# Patient Record
Sex: MALE | Race: OTHER | HISPANIC OR LATINO | Employment: PART TIME | URBAN - METROPOLITAN AREA
[De-identification: names, ages, dates, MRNs, and addresses within clinical notes are randomized per-mention and may not be internally consistent; named-entity substitution may affect disease eponyms.]

---
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
## 2018-01-29 ENCOUNTER — TRANSCRIBE ORDERS (OUTPATIENT)
Dept: LAB | Facility: HOSPITAL | Age: 18
End: 2018-01-29

## 2018-01-29 ENCOUNTER — APPOINTMENT (OUTPATIENT)
Dept: LAB | Facility: HOSPITAL | Age: 18
End: 2018-01-29
Attending: PODIATRIST
Payer: COMMERCIAL

## 2018-01-29 DIAGNOSIS — B35.1 DERMATOPHYTOSIS OF NAIL: Primary | ICD-10-CM

## 2018-01-29 DIAGNOSIS — B35.1 DERMATOPHYTOSIS OF NAIL: ICD-10-CM

## 2018-01-29 LAB
ALBUMIN SERPL BCP-MCNC: 4 G/DL (ref 3.5–5)
ALP SERPL-CCNC: 77 U/L (ref 46–484)
ALT SERPL W P-5'-P-CCNC: 21 U/L (ref 12–78)
AST SERPL W P-5'-P-CCNC: 23 U/L (ref 5–45)
BILIRUB DIRECT SERPL-MCNC: 0.18 MG/DL (ref 0–0.2)
BILIRUB SERPL-MCNC: 0.48 MG/DL (ref 0.2–1)
PROT SERPL-MCNC: 7.1 G/DL (ref 6.4–8.2)

## 2018-01-29 PROCEDURE — 36415 COLL VENOUS BLD VENIPUNCTURE: CPT

## 2018-01-29 PROCEDURE — 80076 HEPATIC FUNCTION PANEL: CPT

## 2018-02-19 ENCOUNTER — APPOINTMENT (OUTPATIENT)
Dept: LAB | Facility: HOSPITAL | Age: 18
End: 2018-02-19
Attending: PODIATRIST
Payer: COMMERCIAL

## 2018-02-19 ENCOUNTER — TRANSCRIBE ORDERS (OUTPATIENT)
Dept: LAB | Facility: HOSPITAL | Age: 18
End: 2018-02-19

## 2018-02-19 DIAGNOSIS — B35.1 DERMATOPHYTOSIS OF NAIL: ICD-10-CM

## 2018-02-19 DIAGNOSIS — B35.1 DERMATOPHYTOSIS OF NAIL: Primary | ICD-10-CM

## 2018-02-19 LAB
ALBUMIN SERPL BCP-MCNC: 3.9 G/DL (ref 3.5–5)
ALP SERPL-CCNC: 87 U/L (ref 46–484)
ALT SERPL W P-5'-P-CCNC: 21 U/L (ref 12–78)
AST SERPL W P-5'-P-CCNC: 17 U/L (ref 5–45)
BILIRUB DIRECT SERPL-MCNC: 0.16 MG/DL (ref 0–0.2)
BILIRUB SERPL-MCNC: 0.51 MG/DL (ref 0.2–1)
PROT SERPL-MCNC: 7.3 G/DL (ref 6.4–8.2)

## 2018-02-19 PROCEDURE — 36415 COLL VENOUS BLD VENIPUNCTURE: CPT

## 2018-02-19 PROCEDURE — 80076 HEPATIC FUNCTION PANEL: CPT

## 2018-11-03 ENCOUNTER — HOSPITAL ENCOUNTER (EMERGENCY)
Facility: HOSPITAL | Age: 18
Discharge: HOME | End: 2018-11-04
Attending: EMERGENCY MEDICINE | Admitting: EMERGENCY MEDICINE
Payer: COMMERCIAL

## 2018-11-03 DIAGNOSIS — A08.4 VIRAL GASTROENTERITIS: Primary | ICD-10-CM

## 2018-11-03 LAB
BASOPHILS # BLD AUTO: 0.03 THOUSANDS/ΜL (ref 0–0.1)
BASOPHILS NFR BLD AUTO: 0 % (ref 0–1)
EOSINOPHIL # BLD AUTO: 0.04 THOUSAND/ΜL (ref 0–0.61)
EOSINOPHIL NFR BLD AUTO: 1 % (ref 0–6)
ERYTHROCYTE [DISTWIDTH] IN BLOOD BY AUTOMATED COUNT: 11.4 % (ref 11.6–15.1)
HCT VFR BLD AUTO: 40.7 % (ref 36.5–49.3)
HGB BLD-MCNC: 13.7 G/DL (ref 12–17)
IMM GRANULOCYTES # BLD AUTO: 0.02 THOUSAND/UL (ref 0–0.2)
IMM GRANULOCYTES NFR BLD AUTO: 0 % (ref 0–2)
LYMPHOCYTES # BLD AUTO: 2.84 THOUSANDS/ΜL (ref 0.6–4.47)
LYMPHOCYTES NFR BLD AUTO: 37 % (ref 14–44)
MCH RBC QN AUTO: 30.3 PG (ref 26.8–34.3)
MCHC RBC AUTO-ENTMCNC: 33.7 G/DL (ref 31.4–37.4)
MCV RBC AUTO: 90 FL (ref 82–98)
MONOCYTES # BLD AUTO: 0.6 THOUSAND/ΜL (ref 0.17–1.22)
MONOCYTES NFR BLD AUTO: 8 % (ref 4–12)
NEUTROPHILS # BLD AUTO: 4.14 THOUSANDS/ΜL (ref 1.85–7.62)
NEUTS SEG NFR BLD AUTO: 54 % (ref 43–75)
NRBC BLD AUTO-RTO: 0 /100 WBCS
PLATELET # BLD AUTO: 260 THOUSANDS/UL (ref 149–390)
PMV BLD AUTO: 10.1 FL (ref 8.9–12.7)
RBC # BLD AUTO: 4.52 MILLION/UL (ref 3.88–5.62)
WBC # BLD AUTO: 7.67 THOUSAND/UL (ref 4.31–10.16)

## 2018-11-03 PROCEDURE — 83690 ASSAY OF LIPASE: CPT | Performed by: EMERGENCY MEDICINE

## 2018-11-03 PROCEDURE — 99284 EMERGENCY DEPT VISIT MOD MDM: CPT

## 2018-11-03 PROCEDURE — 96361 HYDRATE IV INFUSION ADD-ON: CPT

## 2018-11-03 PROCEDURE — 80053 COMPREHEN METABOLIC PANEL: CPT | Performed by: EMERGENCY MEDICINE

## 2018-11-03 PROCEDURE — 96374 THER/PROPH/DIAG INJ IV PUSH: CPT

## 2018-11-03 PROCEDURE — 36415 COLL VENOUS BLD VENIPUNCTURE: CPT

## 2018-11-03 PROCEDURE — 85025 COMPLETE CBC W/AUTO DIFF WBC: CPT | Performed by: EMERGENCY MEDICINE

## 2018-11-03 RX ORDER — DICYCLOMINE HCL 20 MG
20 TABLET ORAL ONCE
Status: COMPLETED | OUTPATIENT
Start: 2018-11-03 | End: 2018-11-03

## 2018-11-03 RX ORDER — ONDANSETRON 2 MG/ML
4 INJECTION INTRAMUSCULAR; INTRAVENOUS ONCE
Status: COMPLETED | OUTPATIENT
Start: 2018-11-03 | End: 2018-11-03

## 2018-11-03 RX ADMIN — SODIUM CHLORIDE 1000 ML: 0.9 INJECTION, SOLUTION INTRAVENOUS at 23:30

## 2018-11-03 RX ADMIN — ONDANSETRON 4 MG: 2 INJECTION INTRAMUSCULAR; INTRAVENOUS at 23:30

## 2018-11-03 RX ADMIN — DICYCLOMINE HYDROCHLORIDE 20 MG: 20 TABLET ORAL at 23:30

## 2018-11-04 VITALS
TEMPERATURE: 98 F | RESPIRATION RATE: 16 BRPM | BODY MASS INDEX: 25.1 KG/M2 | OXYGEN SATURATION: 99 % | WEIGHT: 180 LBS | HEART RATE: 66 BPM | DIASTOLIC BLOOD PRESSURE: 72 MMHG | SYSTOLIC BLOOD PRESSURE: 118 MMHG

## 2018-11-04 LAB
ALBUMIN SERPL BCP-MCNC: 4.6 G/DL (ref 3.5–5)
ALP SERPL-CCNC: 95 U/L (ref 46–116)
ALT SERPL W P-5'-P-CCNC: 20 U/L (ref 12–78)
ANION GAP SERPL CALCULATED.3IONS-SCNC: 4 MMOL/L (ref 4–13)
AST SERPL W P-5'-P-CCNC: 14 U/L (ref 5–45)
BILIRUB SERPL-MCNC: 0.42 MG/DL (ref 0.2–1)
BUN SERPL-MCNC: 21 MG/DL (ref 5–25)
CALCIUM SERPL-MCNC: 9.3 MG/DL (ref 8.3–10.1)
CHLORIDE SERPL-SCNC: 105 MMOL/L (ref 100–108)
CO2 SERPL-SCNC: 30 MMOL/L (ref 21–32)
CREAT SERPL-MCNC: 0.77 MG/DL (ref 0.6–1.3)
GFR SERPL CREATININE-BSD FRML MDRD: 131 ML/MIN/1.73SQ M
GLUCOSE SERPL-MCNC: 83 MG/DL (ref 65–140)
LIPASE SERPL-CCNC: 75 U/L (ref 73–393)
POTASSIUM SERPL-SCNC: 3.5 MMOL/L (ref 3.5–5.3)
PROT SERPL-MCNC: 7.8 G/DL (ref 6.4–8.2)
SODIUM SERPL-SCNC: 139 MMOL/L (ref 136–145)

## 2018-11-04 PROCEDURE — 96361 HYDRATE IV INFUSION ADD-ON: CPT

## 2018-11-04 RX ORDER — ONDANSETRON 4 MG/1
4 TABLET, FILM COATED ORAL EVERY 6 HOURS
Qty: 12 TABLET | Refills: 0 | Status: SHIPPED | OUTPATIENT
Start: 2018-11-04 | End: 2020-05-27

## 2018-11-04 RX ORDER — FAMOTIDINE 20 MG/1
20 TABLET, FILM COATED ORAL DAILY
Qty: 30 TABLET | Refills: 0 | Status: SHIPPED | OUTPATIENT
Start: 2018-11-04 | End: 2020-05-27

## 2018-11-04 RX ORDER — MAGNESIUM HYDROXIDE/ALUMINUM HYDROXICE/SIMETHICONE 120; 1200; 1200 MG/30ML; MG/30ML; MG/30ML
30 SUSPENSION ORAL ONCE
Status: COMPLETED | OUTPATIENT
Start: 2018-11-04 | End: 2018-11-04

## 2018-11-04 RX ADMIN — ALUMINUM HYDROXIDE, MAGNESIUM HYDROXIDE, AND SIMETHICONE 30 ML: 200; 200; 20 SUSPENSION ORAL at 00:27

## 2018-11-04 RX ADMIN — LIDOCAINE HYDROCHLORIDE 15 ML: 20 SOLUTION ORAL; TOPICAL at 00:27

## 2018-11-04 NOTE — ED ATTENDING ATTESTATION
I, [address], DO, saw and evaluated the patient  I have discussed the patient with the resident/non-physician practitioner and agree with the resident's/non-physician practitioner's findings, Plan of Care, and MDM as documented in the resident's/non-physician practitioner's note, except where noted  All available labs and Radiology studies were reviewed  At this point I agree with the current assessment done in the Emergency Department  I have conducted an independent evaluation of this patient a history and physical is as follows:    78-year-old male presents with abdominal pain, nausea, vomiting, diarrhea  Symptoms started about 2 days ago  No fevers or chills  Denies medical problems  Abdominal pain is more cramping  On exam-no acute distress, heart regular, lungs clear, abdomen soft nontender    Plan-IV fluids, Zofran, Bentyl, check labs and reassess    Critical Care Time  CritCare Time    Procedures

## 2018-11-04 NOTE — DISCHARGE INSTRUCTIONS
Enteritis   WHAT YOU NEED TO KNOW:   Enteritis is inflammation of the small intestine  It may be caused by eating foods or drinking liquids contaminated with a virus, bacteria, or parasites  It may also be caused by certain medicines, damage from radiation, and medical conditions such as Crohn disease  DISCHARGE INSTRUCTIONS:   Return to the emergency department if:   · You cannot stop vomiting  · You have not urinated for 12 hours  Contact your healthcare provider if:   · You have a fever over 101 5  · You have blood or mucus in your bowel movements  · You continue to vomit or have diarrhea for more than 3 days, even after treatment  · You have a dry mouth and eyes, you are urinating less than usual, and you feel dizzy when you stand up  · Your mouth or eyes are dry  You are not urinating as much or as often  · You are losing weight without trying  · You have questions or concerns about your condition or care  Medicines:   · Medicines  may be given to fight an infection caused by bacteria or a parasite  You may also need medicines to slow or stop your diarrhea or vomiting  Do not take these medicines unless your healthcare provider say it is okay  Other medicines may be needed to treat medical conditions that are causing enteritis  · Take your medicine as directed  Contact your healthcare provider if you think your medicine is not helping or if you have side effects  Tell him of her if you are allergic to any medicine  Keep a list of the medicines, vitamins, and herbs you take  Include the amounts, and when and why you take them  Bring the list or the pill bottles to follow-up visits  Carry your medicine list with you in case of an emergency  Manage enteritis:   · Eat foods that help to decrease symptoms  Limit or avoid foods and liquids that are high in sugar, fat, and fiber to help relieve diarrhea  It may be helpful to avoid lactose   Lactose is a type of sugar that is found in milk products  You may be able to tolerate soups, broths, well-cooked vegetables, canned fruit, and baked or broiled meats  Ask your dietitian or healthcare provider if you should follow a special diet  You may need to avoid other foods if you have certain medical conditions such as celiac disease  · Drink liquids as directed  Ask how much liquid to drink each day and which liquids are best for you  It is important to prevent or treat dehydration  Even if you have been vomiting, suck on ice chips or take small sips of clear liquids often  Slowly increase the amount of clear liquids you drink  If you become dehydrated, you may need IV liquids  · Drink an oral rehydration solution (ORS) as directed  An ORS contains water, salts, and sugar that are needed to replace lost body fluids  Ask what kind of ORS to use, how much to drink, and where to get it  Prevent enteritis:  Enteritis that is caused by bacteria, parasites, or viruses can be prevented  The following may help to prevent this type of enteritis:  · Wash your hands often  Use soap and water  Wash your hands after you use the bathroom, change a child's diapers, or sneeze  Wash your hands before you prepare or eat food  · Clean surfaces and do laundry often  Wash your clothes and towels separately from the rest of the laundry  Clean surfaces in your home with antibacterial  or bleach  · Clean food thoroughly and cook safely  Wash raw vegetables before you cook  Cook meat, fish, and eggs fully  Do not use the same dishes for raw meat as you do for other foods  Refrigerate any leftover food immediately  · Be aware when you camp or travel  Drink only clean water  Do not drink from rivers or lakes unless you purify or boil the water first  When you travel, drink bottled water and do not add ice  Do not eat fruit that has not been peeled  Do not eat raw fish or meat that is not fully cooked    Follow up with your healthcare provider as directed:  Write down your questions so you remember to ask them during your visits  © 2017 [address] Information is for End User's use only and may not be sold, redistributed or otherwise used for commercial purposes  All illustrations and images included in CareNotes® are the copyrighted property of [employer]  or [employer]  The above information is an  only  It is not intended as medical advice for individual conditions or treatments  Talk to your doctor, nurse or pharmacist before following any medical regimen to see if it is safe and effective for you

## 2018-11-04 NOTE — ED PROVIDER NOTES
History  Chief Complaint   Patient presents with    Abdominal Pain     pt c/o abd pain and nvd     Patient is 15-year-old male who is previously healthy that presents with abdominal pain, nausea, vomiting, diarrhea  Patient says that he has been dealing with these complaints over the past 2 days  He says that his symptoms have been constant since onset  He denies any precipitating events  He tells me the abdominal pain is cramping in nature and diffuse in the upper abdomen  He also complains of nausea and vomiting  Emesis has been nonbloody, nonbilious  The patient has vomited twice today  He also complains of diarrhea  The patient has had 4 episodes of nonbloody, non melanous diarrhea today  The patient has had associated decreased p o  Intake though he is able to keep down water  He denies any fevers or chills  He denies any abdominal surgeries in the past   He denies any urinary complaints including hematuria or dysuria  Other than these complaints today, the patient is a healthy adult  He denies any medical history  He does not take any daily medications  None       Past Medical History:   Diagnosis Date    Asthma        Past Surgical History:   Procedure Laterality Date    EAR TUBE REMOVAL      TONSILLECTOMY         History reviewed  No pertinent family history  I have reviewed and agree with the history as documented  Social History   Substance Use Topics    Smoking status: Never Smoker    Smokeless tobacco: Never Used    Alcohol use No        Review of Systems   Constitutional: Negative for chills, diaphoresis, fatigue and fever  HENT: Negative for drooling, facial swelling, sore throat and trouble swallowing  Eyes: Negative for photophobia  Respiratory: Negative for cough, choking, chest tightness, shortness of breath, wheezing and stridor  Cardiovascular: Negative for chest pain, palpitations and leg swelling     Gastrointestinal: Positive for abdominal pain, diarrhea, nausea and vomiting  Negative for abdominal distention  Genitourinary: Negative for dysuria  Musculoskeletal: Negative for back pain, neck pain and neck stiffness  Skin: Negative for color change, pallor and rash  Neurological: Negative for dizziness, speech difficulty, weakness, light-headedness, numbness and headaches  Hematological: Negative for adenopathy  Psychiatric/Behavioral: Negative for agitation  All other systems reviewed and are negative  Physical Exam  ED Triage Vitals   Temperature Pulse Respirations Blood Pressure SpO2   11/03/18 2145 11/03/18 2145 11/03/18 2145 11/03/18 2145 11/03/18 2145   98 °F (36 7 °C) 68 18 113/71 98 %      Temp Source Heart Rate Source Patient Position - Orthostatic VS BP Location FiO2 (%)   11/03/18 2145 11/03/18 2145 11/03/18 2145 11/03/18 2145 --   Oral Monitor Lying Right arm       Pain Score       11/03/18 2319       5           Orthostatic Vital Signs  Vitals:    11/03/18 2145 11/03/18 2340 11/04/18 0047   BP: 113/71 116/72 118/72   Pulse: 68 (!) 54 66   Patient Position - Orthostatic VS: Lying Lying Lying       Physical Exam   Constitutional: He is oriented to person, place, and time  He appears well-developed and well-nourished  No distress  HENT:   Head: Normocephalic  Eyes: Pupils are equal, round, and reactive to light  EOM are normal    Neck: Normal range of motion  Neck supple  Cardiovascular: Normal rate, regular rhythm, normal heart sounds and intact distal pulses  Exam reveals no gallop and no friction rub  No murmur heard  Pulmonary/Chest: Effort normal and breath sounds normal    Abdominal: Soft  Bowel sounds are normal  He exhibits no distension  There is no tenderness  There is no guarding  Patient with mild abdominal tenderness in the left upper quadrant, right upper quadrant, epigastrium  No rebound tenderness or guarding  No masses palpated  Musculoskeletal: Normal range of motion     Neurological: He is alert and oriented to person, place, and time  No cranial nerve deficit or sensory deficit  He exhibits normal muscle tone  Skin: Capillary refill takes less than 2 seconds  Psychiatric: He has a normal mood and affect  His behavior is normal  Judgment and thought content normal    Vitals reviewed  ED Medications  Medications   sodium chloride 0 9 % bolus 1,000 mL (0 mL Intravenous Stopped 11/4/18 0124)   ondansetron (ZOFRAN) injection 4 mg (4 mg Intravenous Given 11/3/18 2330)   dicyclomine (BENTYL) tablet 20 mg (20 mg Oral Given 11/3/18 2330)   aluminum-magnesium hydroxide-simethicone (MYLANTA) 200-200-20 mg/5 mL oral suspension 30 mL (30 mL Oral Given 11/4/18 0027)   lidocaine viscous (XYLOCAINE) 2 % mucosal solution 15 mL (15 mL Swish & Spit Given 11/4/18 0027)       Diagnostic Studies  Results Reviewed     Procedure Component Value Units Date/Time    Comprehensive metabolic panel [[id number]] Collected:  11/03/18 2325    Lab Status:  Final result Specimen:  Blood from Arm, Right Updated:  11/04/18 0011     Sodium 139 mmol/L      Potassium 3 5 mmol/L      Chloride 105 mmol/L      CO2 30 mmol/L      ANION GAP 4 mmol/L      BUN 21 mg/dL      Creatinine 0 77 mg/dL      Glucose 83 mg/dL      Calcium 9 3 mg/dL      AST 14 U/L      ALT 20 U/L      Alkaline Phosphatase 95 U/L      Total Protein 7 8 g/dL      Albumin 4 6 g/dL      Total Bilirubin 0 42 mg/dL      eGFR 131 ml/min/1 73sq m     Narrative:         National Kidney Disease Education Program recommendations are as follows:  GFR calculation is accurate only with a steady state creatinine  Chronic Kidney disease less than 60 ml/min/1 73 sq  meters  Kidney failure less than 15 ml/min/1 73 sq  meters      Lipase [[id number]]  (Normal) Collected:  11/03/18 2325    Lab Status:  Final result Specimen:  Blood from Arm, Right Updated:  11/04/18 0011     Lipase 75 u/L     CBC and differential [[id number]]  (Abnormal) Collected:  11/03/18 2325    Lab Status:  Final result Specimen:  Blood from Arm, Right Updated:  11/03/18 2348     WBC 7 67 Thousand/uL      RBC 4 52 Million/uL      Hemoglobin 13 7 g/dL      Hematocrit 40 7 %      MCV 90 fL      MCH 30 3 pg      MCHC 33 7 g/dL      RDW 11 4 (L) %      MPV 10 1 fL      Platelets 744 Thousands/uL      nRBC 0 /100 WBCs      Neutrophils Relative 54 %      Immat GRANS % 0 %      Lymphocytes Relative 37 %      Monocytes Relative 8 %      Eosinophils Relative 1 %      Basophils Relative 0 %      Neutrophils Absolute 4 14 Thousands/µL      Immature Grans Absolute 0 02 Thousand/uL      Lymphocytes Absolute 2 84 Thousands/µL      Monocytes Absolute 0 60 Thousand/µL      Eosinophils Absolute 0 04 Thousand/µL      Basophils Absolute 0 03 Thousands/µL                  No orders to display         Procedures  Procedures      Phone Consults  ED Phone Contact    ED Course                               MDM  Number of Diagnoses or Management Options  Viral gastroenteritis: new and does not require workup  Diagnosis management comments: Patient is a 57-year-old male who presents with nausea, vomiting, epigastric cramping pain, diarrhea suggestive of viral gastroenteritis  Patient's lab workup was unremarkable  Patient was given IV fluids and Zofran  Zofran improved her nausea significantly  He was also given Bentyl and a GI cocktail for analgesia  Patient says that the GI cocktail specifically significantly improved his pain  The patient did not suffer any further vomiting or diarrhea while in the emergency department  I will give the patient a diagnosis of viral gastroenteritis and discharged with Zofran and Pepcid  Patient was instructed to follow up with his primary care provider over the next several days  The patient was hemodynamically stable throughout his time here in the emergency department         Amount and/or Complexity of Data Reviewed  Clinical lab tests: ordered and reviewed  Tests in the radiology section of CPT®: ordered and reviewed    Risk of Complications, Morbidity, and/or Mortality  Presenting problems: low  Diagnostic procedures: low  Management options: low    Patient Progress  Patient progress: improved    CritCare Time    Disposition  Final diagnoses:   Viral gastroenteritis     Time reflects when diagnosis was documented in both MDM as applicable and the Disposition within this note     Time User Action Codes Description Comment    11/4/2018  1:19 AM Zohreh Aus Add [A08 4] Viral gastroenteritis       ED Disposition     ED Disposition Condition Comment    Discharge  [place] discharge to home/self care  Condition at discharge: Good        Follow-up Information     Follow up With Specialties Details Why Contact Info Additional [address] Emergency Department Emergency Medicine  If symptoms worsen [address] [place]  [phone]  ED, [address] [place] [address], [id number]          Discharge Medication List as of 11/4/2018  1:24 AM      START taking these medications    Details   famotidine (PEPCID) 20 mg tablet Take 1 tablet (20 mg total) by mouth daily, Starting Sun 11/4/2018, Print      ondansetron (ZOFRAN) 4 mg tablet Take 1 tablet (4 mg total) by mouth every 6 (six) hours, Starting Sun 11/4/2018, Print           No discharge procedures on file  ED Provider  Attending physically available and evaluated Trevor Hernandez I managed the patient along with the ED Attending      Electronically Signed by         Rodrigo Espinoza MD  11/04/18 4402

## 2020-05-27 ENCOUNTER — HOSPITAL ENCOUNTER (EMERGENCY)
Facility: HOSPITAL | Age: 20
Discharge: HOME | End: 2020-05-27
Attending: EMERGENCY MEDICINE | Admitting: EMERGENCY MEDICINE

## 2020-05-27 VITALS
WEIGHT: 175 LBS | DIASTOLIC BLOOD PRESSURE: 60 MMHG | HEART RATE: 56 BPM | OXYGEN SATURATION: 99 % | RESPIRATION RATE: 18 BRPM | TEMPERATURE: 98.1 F | BODY MASS INDEX: 24.41 KG/M2 | SYSTOLIC BLOOD PRESSURE: 106 MMHG

## 2020-05-27 DIAGNOSIS — R19.7 VOMITING AND DIARRHEA: Primary | ICD-10-CM

## 2020-05-27 DIAGNOSIS — R10.9 ABDOMINAL PAIN: ICD-10-CM

## 2020-05-27 DIAGNOSIS — R11.10 VOMITING AND DIARRHEA: Primary | ICD-10-CM

## 2020-05-27 LAB
ALBUMIN SERPL BCP-MCNC: 4.2 G/DL (ref 3.5–5)
ALP SERPL-CCNC: 81 U/L (ref 46–116)
ALT SERPL W P-5'-P-CCNC: 18 U/L (ref 12–78)
ANION GAP SERPL CALCULATED.3IONS-SCNC: 4 MMOL/L (ref 4–13)
AST SERPL W P-5'-P-CCNC: 13 U/L (ref 5–45)
BASOPHILS # BLD AUTO: 0.04 THOUSANDS/ΜL (ref 0–0.1)
BASOPHILS NFR BLD AUTO: 1 % (ref 0–1)
BILIRUB SERPL-MCNC: 0.34 MG/DL (ref 0.2–1)
BUN SERPL-MCNC: 21 MG/DL (ref 5–25)
CALCIUM SERPL-MCNC: 9.2 MG/DL (ref 8.3–10.1)
CHLORIDE SERPL-SCNC: 107 MMOL/L (ref 100–108)
CO2 SERPL-SCNC: 27 MMOL/L (ref 21–32)
CREAT SERPL-MCNC: 0.77 MG/DL (ref 0.6–1.3)
EOSINOPHIL # BLD AUTO: 0.11 THOUSAND/ΜL (ref 0–0.61)
EOSINOPHIL NFR BLD AUTO: 2 % (ref 0–6)
ERYTHROCYTE [DISTWIDTH] IN BLOOD BY AUTOMATED COUNT: 11.9 % (ref 11.6–15.1)
GFR SERPL CREATININE-BSD FRML MDRD: 129 ML/MIN/1.73SQ M
GLUCOSE SERPL-MCNC: 93 MG/DL (ref 65–140)
HCT VFR BLD AUTO: 39.9 % (ref 36.5–49.3)
HGB BLD-MCNC: 13.3 G/DL (ref 12–17)
IMM GRANULOCYTES # BLD AUTO: 0.01 THOUSAND/UL (ref 0–0.2)
IMM GRANULOCYTES NFR BLD AUTO: 0 % (ref 0–2)
LIPASE SERPL-CCNC: 69 U/L (ref 73–393)
LYMPHOCYTES # BLD AUTO: 2.19 THOUSANDS/ΜL (ref 0.6–4.47)
LYMPHOCYTES NFR BLD AUTO: 32 % (ref 14–44)
MCH RBC QN AUTO: 30.4 PG (ref 26.8–34.3)
MCHC RBC AUTO-ENTMCNC: 33.3 G/DL (ref 31.4–37.4)
MCV RBC AUTO: 91 FL (ref 82–98)
MONOCYTES # BLD AUTO: 0.48 THOUSAND/ΜL (ref 0.17–1.22)
MONOCYTES NFR BLD AUTO: 7 % (ref 4–12)
NEUTROPHILS # BLD AUTO: 4.09 THOUSANDS/ΜL (ref 1.85–7.62)
NEUTS SEG NFR BLD AUTO: 58 % (ref 43–75)
NRBC BLD AUTO-RTO: 0 /100 WBCS
PLATELET # BLD AUTO: 230 THOUSANDS/UL (ref 149–390)
PMV BLD AUTO: 10.3 FL (ref 8.9–12.7)
POTASSIUM SERPL-SCNC: 3.9 MMOL/L (ref 3.5–5.3)
PROT SERPL-MCNC: 6.9 G/DL (ref 6.4–8.2)
RBC # BLD AUTO: 4.37 MILLION/UL (ref 3.88–5.62)
SODIUM SERPL-SCNC: 138 MMOL/L (ref 136–145)
WBC # BLD AUTO: 6.92 THOUSAND/UL (ref 4.31–10.16)

## 2020-05-27 PROCEDURE — 99284 EMERGENCY DEPT VISIT MOD MDM: CPT | Performed by: EMERGENCY MEDICINE

## 2020-05-27 PROCEDURE — 96374 THER/PROPH/DIAG INJ IV PUSH: CPT

## 2020-05-27 PROCEDURE — 99283 EMERGENCY DEPT VISIT LOW MDM: CPT

## 2020-05-27 PROCEDURE — 96361 HYDRATE IV INFUSION ADD-ON: CPT

## 2020-05-27 PROCEDURE — 36415 COLL VENOUS BLD VENIPUNCTURE: CPT | Performed by: EMERGENCY MEDICINE

## 2020-05-27 PROCEDURE — 85025 COMPLETE CBC W/AUTO DIFF WBC: CPT | Performed by: EMERGENCY MEDICINE

## 2020-05-27 PROCEDURE — 83690 ASSAY OF LIPASE: CPT | Performed by: EMERGENCY MEDICINE

## 2020-05-27 PROCEDURE — 96375 TX/PRO/DX INJ NEW DRUG ADDON: CPT

## 2020-05-27 PROCEDURE — 80053 COMPREHEN METABOLIC PANEL: CPT | Performed by: EMERGENCY MEDICINE

## 2020-05-27 RX ORDER — ONDANSETRON 4 MG/1
4 TABLET, ORALLY DISINTEGRATING ORAL EVERY 6 HOURS PRN
Qty: 20 TABLET | Refills: 0 | Status: SHIPPED | OUTPATIENT
Start: 2020-05-27

## 2020-05-27 RX ORDER — DICYCLOMINE HCL 20 MG
20 TABLET ORAL 2 TIMES DAILY
Qty: 20 TABLET | Refills: 0 | Status: SHIPPED | OUTPATIENT
Start: 2020-05-27

## 2020-05-27 RX ORDER — KETOROLAC TROMETHAMINE 30 MG/ML
15 INJECTION, SOLUTION INTRAMUSCULAR; INTRAVENOUS ONCE
Status: COMPLETED | OUTPATIENT
Start: 2020-05-27 | End: 2020-05-27

## 2020-05-27 RX ORDER — ONDANSETRON 2 MG/ML
4 INJECTION INTRAMUSCULAR; INTRAVENOUS ONCE
Status: COMPLETED | OUTPATIENT
Start: 2020-05-27 | End: 2020-05-27

## 2020-05-27 RX ORDER — ACETAMINOPHEN 325 MG/1
975 TABLET ORAL ONCE
Status: COMPLETED | OUTPATIENT
Start: 2020-05-27 | End: 2020-05-27

## 2020-05-27 RX ORDER — DICYCLOMINE HCL 20 MG
20 TABLET ORAL ONCE
Status: COMPLETED | OUTPATIENT
Start: 2020-05-27 | End: 2020-05-27

## 2020-05-27 RX ADMIN — SODIUM CHLORIDE 1000 ML: 0.9 INJECTION, SOLUTION INTRAVENOUS at 09:36

## 2020-05-27 RX ADMIN — KETOROLAC TROMETHAMINE 15 MG: 30 INJECTION, SOLUTION INTRAMUSCULAR at 09:35

## 2020-05-27 RX ADMIN — ACETAMINOPHEN 975 MG: 325 TABLET ORAL at 09:34

## 2020-05-27 RX ADMIN — DICYCLOMINE HYDROCHLORIDE 20 MG: 20 TABLET ORAL at 09:59

## 2020-05-27 RX ADMIN — ONDANSETRON 4 MG: 2 INJECTION INTRAMUSCULAR; INTRAVENOUS at 09:35

## 2021-01-30 ENCOUNTER — HOSPITAL ENCOUNTER (EMERGENCY)
Facility: HOSPITAL | Age: 21
Discharge: HOME | End: 2021-01-30
Attending: EMERGENCY MEDICINE | Admitting: EMERGENCY MEDICINE

## 2021-01-30 VITALS
HEART RATE: 82 BPM | BODY MASS INDEX: 24.41 KG/M2 | OXYGEN SATURATION: 98 % | DIASTOLIC BLOOD PRESSURE: 69 MMHG | WEIGHT: 175 LBS | TEMPERATURE: 99 F | SYSTOLIC BLOOD PRESSURE: 150 MMHG | RESPIRATION RATE: 20 BRPM

## 2021-01-30 DIAGNOSIS — R52 GENERALIZED BODY ACHES: ICD-10-CM

## 2021-01-30 DIAGNOSIS — R11.0 NAUSEA: ICD-10-CM

## 2021-01-30 DIAGNOSIS — R68.83 CHILLS: ICD-10-CM

## 2021-01-30 DIAGNOSIS — B34.9 VIRAL SYNDROME: Primary | ICD-10-CM

## 2021-01-30 PROCEDURE — 87635 SARS-COV-2 COVID-19 AMP PRB: CPT | Performed by: EMERGENCY MEDICINE

## 2021-01-30 PROCEDURE — 99285 EMERGENCY DEPT VISIT HI MDM: CPT | Performed by: EMERGENCY MEDICINE

## 2021-01-30 PROCEDURE — 99283 EMERGENCY DEPT VISIT LOW MDM: CPT

## 2021-01-30 PROCEDURE — 96372 THER/PROPH/DIAG INJ SC/IM: CPT

## 2021-01-30 RX ORDER — ONDANSETRON 4 MG/1
4 TABLET, FILM COATED ORAL EVERY 6 HOURS
Qty: 12 TABLET | Refills: 0 | Status: SHIPPED | OUTPATIENT
Start: 2021-01-30

## 2021-01-30 RX ORDER — ONDANSETRON 4 MG/1
4 TABLET, ORALLY DISINTEGRATING ORAL ONCE
Status: COMPLETED | OUTPATIENT
Start: 2021-01-30 | End: 2021-01-30

## 2021-01-30 RX ORDER — KETOROLAC TROMETHAMINE 30 MG/ML
30 INJECTION, SOLUTION INTRAMUSCULAR; INTRAVENOUS ONCE
Status: COMPLETED | OUTPATIENT
Start: 2021-01-30 | End: 2021-01-30

## 2021-01-30 RX ADMIN — KETOROLAC TROMETHAMINE 30 MG: 30 INJECTION, SOLUTION INTRAMUSCULAR at 19:45

## 2021-01-30 RX ADMIN — ONDANSETRON 4 MG: 4 TABLET, ORALLY DISINTEGRATING ORAL at 19:45

## 2021-01-31 ENCOUNTER — TELEPHONE (OUTPATIENT)
Dept: OTHER | Facility: OTHER | Age: 21
End: 2021-01-31

## 2021-01-31 LAB — SARS-COV-2 RNA RESP QL NAA+PROBE: POSITIVE

## 2021-01-31 NOTE — ED PROVIDER NOTES
History  Chief Complaint   Patient presents with    Generalized Body Aches     Pt presents ambulatory c/o generalized body aches, chills, and loss of taste/smell  57-year-old male history of asthma with rare albuterol use presenting with viral syndrome  Patient reports that since yesterday afternoon, he has been having generalized body aches, subjective fevers and chills, frontal headache not maximal in onset and not the worst of his life, and nausea  He also reports complete loss of smell and taste  He reports he tried to take some DayQuil as well as some Sudafed with minimal improvement  He reports potential exposure with the last couple weeks at his brother's house with family member who is known COVID positive  He denies any other known contacts  He denies any complaints at this time  He denies any vision changes, chest pain, shortness of breath, abdominal pain, vomiting, or any bladder or bowel changes  Prior to Admission Medications   Prescriptions Last Dose Informant Patient Reported? Taking?   dicyclomine (BENTYL) 20 mg tablet   No No   Sig: Take 1 tablet (20 mg total) by mouth 2 (two) times a day   ondansetron (ZOFRAN-ODT) 4 mg disintegrating tablet   No No   Sig: Take 1 tablet (4 mg total) by mouth every 6 (six) hours as needed for nausea or vomiting      Facility-Administered Medications: None       Past Medical History:   Diagnosis Date    Asthma        Past Surgical History:   Procedure Laterality Date    EAR TUBE REMOVAL      TONSILLECTOMY         History reviewed  No pertinent family history  I have reviewed and agree with the history as documented      E-Cigarette/Vaping    E-Cigarette Use Never User      E-Cigarette/Vaping Substances    Nicotine No     THC No     CBD No     Flavoring No     Other No     Unknown No      Social History     Tobacco Use    Smoking status: Never Smoker    Smokeless tobacco: Never Used   Substance Use Topics    Alcohol use: No    Drug use: No        Review of Systems   Constitutional: Positive for chills  Negative for appetite change, diaphoresis, fever and unexpected weight change  HENT: Negative for congestion and rhinorrhea  Eyes: Negative for photophobia and visual disturbance  Respiratory: Negative for cough, chest tightness and shortness of breath  Cardiovascular: Negative for chest pain, palpitations and leg swelling  Gastrointestinal: Positive for nausea  Negative for abdominal distention, abdominal pain, blood in stool, constipation, diarrhea and vomiting  Genitourinary: Negative for dysuria and hematuria  Musculoskeletal: Positive for myalgias  Negative for back pain, joint swelling, neck pain and neck stiffness  Skin: Negative for color change, pallor, rash and wound  Neurological: Positive for headaches  Negative for dizziness, syncope, weakness and light-headedness  Psychiatric/Behavioral: Negative for agitation  All other systems reviewed and are negative  Physical Exam  ED Triage Vitals [01/30/21 1854]   Temperature Pulse Respirations Blood Pressure SpO2   99 °F (37 2 °C) 82 20 150/69 98 %      Temp Source Heart Rate Source Patient Position - Orthostatic VS BP Location FiO2 (%)   Oral Monitor Lying Left arm --      Pain Score       7             Orthostatic Vital Signs  Vitals:    01/30/21 1854   BP: 150/69   Pulse: 82   Patient Position - Orthostatic VS: Lying       Physical Exam  Vitals signs and nursing note reviewed  Constitutional:       General: He is not in acute distress  Appearance: Normal appearance  He is well-developed  He is not ill-appearing, toxic-appearing or diaphoretic  HENT:      Head: Normocephalic and atraumatic  Nose: Nose normal  No congestion or rhinorrhea  Mouth/Throat:      Mouth: Mucous membranes are moist       Pharynx: Oropharynx is clear  No oropharyngeal exudate or posterior oropharyngeal erythema  Eyes:      General: No scleral icterus          Right eye: No discharge  Left eye: No discharge  Extraocular Movements: Extraocular movements intact  Conjunctiva/sclera: Conjunctivae normal       Pupils: Pupils are equal, round, and reactive to light  Neck:      Musculoskeletal: Normal range of motion and neck supple  No neck rigidity or muscular tenderness  Vascular: No JVD  Trachea: No tracheal deviation  Cardiovascular:      Rate and Rhythm: Normal rate and regular rhythm  Heart sounds: Normal heart sounds  No murmur  No friction rub  No gallop  Pulmonary:      Effort: Pulmonary effort is normal  No respiratory distress  Breath sounds: Normal breath sounds  No stridor  No wheezing or rales  Comments: Clear to auscultation bilaterally  Chest:      Chest wall: No tenderness  Abdominal:      General: Bowel sounds are normal  There is no distension  Palpations: Abdomen is soft  Tenderness: There is no abdominal tenderness  There is no right CVA tenderness, left CVA tenderness, guarding or rebound  Musculoskeletal: Normal range of motion  General: No swelling, tenderness, deformity or signs of injury  Right lower leg: No edema  Left lower leg: No edema  Lymphadenopathy:      Cervical: No cervical adenopathy  Skin:     General: Skin is warm and dry  Coloration: Skin is not pale  Findings: No erythema or rash  Neurological:      General: No focal deficit present  Mental Status: He is alert and oriented to person, place, and time  Mental status is at baseline  Cranial Nerves: No cranial nerve deficit  Sensory: No sensory deficit  Motor: No weakness or abnormal muscle tone  Coordination: Coordination normal       Gait: Gait normal       Comments: A&Ox3 to person, place, and time  CN 2-12 intact  Strength 5/5 throughout  Sensation grossly intact  Cerebellar exam including gait intact     Psychiatric:         Behavior: Behavior normal          Thought Content: Thought content normal          ED Medications  Medications   ketorolac (TORADOL) injection 30 mg (30 mg Intramuscular Given 1/30/21 1945)   ondansetron (ZOFRAN-ODT) dispersible tablet 4 mg (4 mg Oral Given 1/30/21 1945)       Diagnostic Studies  Results Reviewed     Procedure Component Value Units Date/Time    Novel Coronavirus Davey Milligan [[id number]]  (Abnormal) Collected: 01/30/21 1945    Lab Status: Final result Specimen: Nares from Nose Updated: 01/31/21 1318     SARS-CoV-2 Positive    Narrative: The specimen collection materials, transport medium, and/or testing methodology utilized in the production of these test results have been proven to be reliable in a limited validation with an abbreviated program under the Emergency Utilization Authorization provided by the FDA  Testing reported as "Presumptive positive" will be confirmed with secondary testing with a reference laboratory to ensure result accuracy  Clinical caution and judgement should be used with the interpretation of these results with consideration of the clinical impression and other laboratory testing  Testing reported as "Positive" or "Negative" has been proven to be accurate according to standard laboratory validation requirements  All testing is performed with control materials showing appropriate reactivity at standard intervals  No orders to display         Procedures  Procedures      ED Course                             SBIRT 20yo+      Most Recent Value   SBIRT (22 yo +)   In order to provide better care to our patients, we are screening all of our patients for alcohol and drug use  Would it be okay to ask you these screening questions? Yes Filed at: 01/30/2021 1900   Initial Alcohol Screen: US AUDIT-C    1  How often do you have a drink containing alcohol?  0 Filed at: 01/30/2021 1900   2  How many drinks containing alcohol do you have on a typical day you are drinking?    0 Filed at: 01/30/2021 1900   3a  Male UNDER 65: How often do you have five or more drinks on one occasion? 0 Filed at: 01/30/2021 1900   Audit-C Score  0 Filed at: 01/30/2021 1900   REAL: How many times in the past year have you    Used an illegal drug or used a prescription medication for non-medical reasons? Never Filed at: 01/30/2021 1900                MDM  Number of Diagnoses or Management Options  Chills:   Generalized body aches:   Nausea:   Viral syndrome:   Diagnosis management comments: 76-year-old male history of asthma with rare albuterol use presenting with viral syndrome  Symptoms including loss of smell and taste concerning for likely COVID  Oxygenation high 90s and did not drop with ambulation  Patient opting for symptom control and testing  Given IM Toradol with moderate to significant improvement and muscle aches  Outpatient COVID test sent  Given instructions and return precautions  Given CDC guidelines for COVID  Advised follow-up primary care provider  All questions answered  Patient acknowledged understanding of all written and verbal instructions and return precautions  Discharge         Amount and/or Complexity of Data Reviewed  Clinical lab tests: reviewed  Tests in the radiology section of CPT®: reviewed  Tests in the medicine section of CPT®: reviewed  Review and summarize past medical records: yes  Independent visualization of images, tracings, or specimens: yes    Risk of Complications, Morbidity, and/or Mortality  Presenting problems: low  Diagnostic procedures: low  Management options: low    Patient Progress  Patient progress: improved      Disposition  Final diagnoses:   Viral syndrome   Generalized body aches   Chills   Nausea     Time reflects when diagnosis was documented in both MDM as applicable and the Disposition within this note     Time User Action Codes Description Comment    1/30/2021  7:11 PM John Valladares [B34 9] Viral syndrome     1/30/2021  7:11 PM Therman Millet [R52] Generalized body aches     1/30/2021  7:11 PM Kayla Haque Add [N18 85] Chills     1/30/2021  7:11 PM Kayla Haque Add [R11 0] Nausea       ED Disposition     ED Disposition Condition Date/Time Comment    Discharge Stable Sat Jan 30, 2021  8:03 PM Joe Low discharge to home/self care  Follow-up Information     Follow up With Specialties Details Why Contact Info Additional Information    Marleny Chan MD Family Medicine Schedule an appointment as soon as possible for a visit in 1 week  [address] [facility] [address]       [address] [place] [address] 34 [facility] Emergency Department Emergency Medicine Go to  If symptoms worsen [address]  958 [facility] 64 [facility] Emergency Department, [address]          Discharge Medication List as of 1/30/2021  8:03 PM      START taking these medications    Details   ondansetron (ZOFRAN) 4 mg tablet Take 1 tablet (4 mg total) by mouth every 6 (six) hours, Starting Sat 1/30/2021, Normal         CONTINUE these medications which have NOT CHANGED    Details   dicyclomine (BENTYL) 20 mg tablet Take 1 tablet (20 mg total) by mouth 2 (two) times a day, Starting Wed 5/27/2020, Normal      ondansetron (ZOFRAN-ODT) 4 mg disintegrating tablet Take 1 tablet (4 mg total) by mouth every 6 (six) hours as needed for nausea or vomiting, Starting Wed 5/27/2020, Normal           No discharge procedures on file  PDMP Review     None           ED Provider  Attending physically available and evaluated Joe Low  I managed the patient along with the ED Attending      Electronically Signed by

## 2021-01-31 NOTE — DISCHARGE INSTRUCTIONS
Recommend taking a 1000 mg of Tylenol about 1 hour prior to bed  Then recommend taking 650 mg of Tylenol every 6 hours and/or 600 mg of ibuprofen every 6 hours as needed for muscle aches  Please take the Zofran as needed for Zofran according to medication instructions  Please follow the below CDC guidelines for COVID which include wearing a mask at all times even at home, good handwashing, and self isolating to 1 room of your house as much as possible  Please follow-up with primary care provider  Please return for significant worsening symptoms, severe shortness of breath, severe chest pain, or any other concerning signs or symptoms  Please expect a phone call within the next 1-2 days with your COVID results  [address]    Your healthcare provider and/or public health staff have evaluated you and have determined that you do not need to remain in the hospital at this time  At this time you can be isolated at home where you will be monitored by staff from your local or state health department  You should carefully follow the prevention and isolation steps below until a healthcare provider or local or state health department says that you can return to your normal activities  Stay home except to get medical care    People who are mildly ill with COVID-19 are able to isolate at home during their illness  You should restrict activities outside your home, except for getting medical care  Do not go to work, school, or public areas  Avoid using public transportation, ride-sharing, or taxis  Separate yourself from other people and animals in your home    People: As much as possible, you should stay in a specific room and away from other people in your home  Also, you should use a separate bathroom, if available  Animals: You should restrict contact with pets and other animals while you are sick with COVID-19, just like you would around other people   Although there have not been reports of pets or other animals becoming sick with COVID-19, it is still recommended that people sick with COVID-19 limit contact with animals until more information is known about the virus  When possible, have another member of your household care for your animals while you are sick  If you are sick with COVID-19, avoid contact with your pet, including petting, snuggling, being kissed or licked, and sharing food  If you must care for your pet or be around animals while you are sick, wash your hands before and after you interact with pets and wear a facemask  See COVID-19 and Animals for more information  Call ahead before visiting your doctor    If you have a medical appointment, call the healthcare provider and tell them that you have or may have COVID-19  This will help the healthcare providers office take steps to keep other people from getting infected or exposed  Wear a facemask    You should wear a facemask when you are around other people (e g , sharing a room or vehicle) or pets and before you enter a healthcare providers office  If you are not able to wear a facemask (for example, because it causes trouble breathing), then people who live with you should not stay in the same room with you, or they should wear a facemask if they enter your room  Cover your coughs and sneezes    Cover your mouth and nose with a tissue when you cough or sneeze  Throw used tissues in a lined trash can  Immediately wash your hands with soap and water for at least 20 seconds or, if soap and water are not available, clean your hands with an alcohol-based hand  that contains at least 60% alcohol  Clean your hands often    Wash your hands often with soap and water for at least 20 seconds, especially after blowing your nose, coughing, or sneezing; going to the bathroom; and before eating or preparing food   If soap and water are not readily available, use an alcohol-based hand  with at least 60% alcohol, covering all surfaces of your hands and rubbing them together until they feel dry  Soap and water are the best option if hands are visibly dirty  Avoid touching your eyes, nose, and mouth with unwashed hands  Avoid sharing personal household items    You should not share dishes, drinking glasses, cups, eating utensils, towels, or bedding with other people or pets in your home  After using these items, they should be washed thoroughly with soap and water  Clean all high-touch surfaces everyday    High touch surfaces include counters, tabletops, doorknobs, bathroom fixtures, toilets, phones, keyboards, tablets, and bedside tables  Also, clean any surfaces that may have blood, stool, or body fluids on them  Use a household cleaning spray or wipe, according to the label instructions  Labels contain instructions for safe and effective use of the cleaning product including precautions you should take when applying the product, such as wearing gloves and making sure you have good ventilation during use of the product  Monitor your symptoms    Seek prompt medical attention if your illness is worsening (e g , difficulty breathing)  Before seeking care, call your healthcare provider and tell them that you have, or are being evaluated for, COVID-19  Put on a facemask before you enter the facility  These steps will help the healthcare providers office to keep other people in the office or waiting room from getting infected or exposed  Ask your healthcare provider to call the local or [facility] health department  Persons who are placed under active monitoring or facilitated self-monitoring should follow instructions provided by their local health department or occupational health professionals, as appropriate  If you have a medical emergency and need to call 911, notify the dispatch personnel that you have, or are being evaluated for COVID-19   If possible, put on a facemask before emergency medical services arrive  Discontinuing home isolation    Patients with confirmed COVID-19 should remain under home isolation precautions until the following conditions are met: They have had no fever for at least 24 hours (that is one full day of no fever without the use medicine that reduces fevers)  AND  other symptoms have improved (for example, when their cough or shortness of breath have improved)  AND  If had mild or moderate illness, at least 10 days have passed since their symptoms first appeared or if severe illness (needed oxygen) or immunosuppressed, at least 20 days have passed since symptoms first appeared  Patients with confirmed COVID-19 should also notify close contacts (including their workplace) and ask that they self-quarantine  Currently, close contact is defined as being within 6 feet for 15 minutes or more from the period 24 hours starting 48 hours before symptom onset to the time at which the patient went into isolation  Close contacts of patients diagnosed with COVID-19 should be instructed by the patient to self-quarantine for 14 days from the last time of their last contact with the patient       Source: [employer]

## 2021-01-31 NOTE — ED ATTENDING ATTESTATION
1/30/2021  IAngelia MD, saw and evaluated the patient  I have discussed the patient with the resident/non-physician practitioner and agree with the resident's/non-physician practitioner's findings, Plan of Care, and MDM as documented in the resident's/non-physician practitioner's note, except where noted  All available labs and Radiology studies were reviewed  I was present for key portions of any procedure(s) performed by the resident/non-physician practitioner and I was immediately available to provide assistance  At this point I agree with the current assessment done in the Emergency Department  I have conducted an independent evaluation of this patient a history and physical is as follows:  Bodies, headache, COVID exposure, loss of taste and smell  Entirely benign exam here    Will swab for COVID, ambulatory pulse oxygenation, supportive care  ED Course         Critical Care Time  Procedures

## 2021-01-31 NOTE — TELEPHONE ENCOUNTER
Your test for the novel coronavirus, also known as COVID-19, was positive  The sample showed that the virus was present  Positive COVID-19 test results are reportable to the [place] Department of Health  You may receive a call from trained public health staff to conduct an interview  It is important to answer their call  They will ask you to verify who you are  During the call they will ask you about what symptoms you have, what you did before you got sick, and who you were close to while sick  The health department does this to make sure everyone stays healthy and to reduce the spread of the virus  If you would like to verify if the caller does in fact work in contact tracing, call the [address] [place] [address] at [facility] ([phone])  For additional information, please visit the [facility] website: www health [place] gov     If you have any additional questions, we can schedule a virtual visit for you with a provider or call the [facility] hotline [phone], option 7, for care advice    For additional information, please visit the Coronavirus FAQ on the [facility] home page ([facility]  org)

## 2021-12-05 ENCOUNTER — HOSPITAL ENCOUNTER (EMERGENCY)
Facility: HOSPITAL | Age: 21
Discharge: HOME | End: 2021-12-05
Attending: EMERGENCY MEDICINE | Admitting: EMERGENCY MEDICINE

## 2021-12-05 VITALS
HEART RATE: 59 BPM | TEMPERATURE: 98.7 F | DIASTOLIC BLOOD PRESSURE: 56 MMHG | RESPIRATION RATE: 16 BRPM | OXYGEN SATURATION: 100 % | SYSTOLIC BLOOD PRESSURE: 121 MMHG

## 2021-12-05 DIAGNOSIS — R11.2 NAUSEA AND VOMITING: Primary | ICD-10-CM

## 2021-12-05 LAB
ALBUMIN SERPL BCP-MCNC: 4.5 G/DL (ref 3.5–5)
ALP SERPL-CCNC: 94 U/L (ref 46–116)
ALT SERPL W P-5'-P-CCNC: 28 U/L (ref 12–78)
ANION GAP SERPL CALCULATED.3IONS-SCNC: 8 MMOL/L (ref 4–13)
AST SERPL W P-5'-P-CCNC: 22 U/L (ref 5–45)
BASOPHILS # BLD AUTO: 0.02 THOUSANDS/ΜL (ref 0–0.1)
BASOPHILS NFR BLD AUTO: 0 % (ref 0–1)
BILIRUB SERPL-MCNC: 0.58 MG/DL (ref 0.2–1)
BUN SERPL-MCNC: 25 MG/DL (ref 5–25)
CALCIUM SERPL-MCNC: 10.7 MG/DL (ref 8.3–10.1)
CHLORIDE SERPL-SCNC: 107 MMOL/L (ref 100–108)
CO2 SERPL-SCNC: 25 MMOL/L (ref 21–32)
CREAT SERPL-MCNC: 0.93 MG/DL (ref 0.6–1.3)
EOSINOPHIL # BLD AUTO: 0 THOUSAND/ΜL (ref 0–0.61)
EOSINOPHIL NFR BLD AUTO: 0 % (ref 0–6)
ERYTHROCYTE [DISTWIDTH] IN BLOOD BY AUTOMATED COUNT: 11.8 % (ref 11.6–15.1)
GFR SERPL CREATININE-BSD FRML MDRD: 115 ML/MIN/1.73SQ M
GLUCOSE SERPL-MCNC: 80 MG/DL (ref 65–140)
HCT VFR BLD AUTO: 42.8 % (ref 36.5–49.3)
HGB BLD-MCNC: 14.5 G/DL (ref 12–17)
IMM GRANULOCYTES # BLD AUTO: 0.08 THOUSAND/UL (ref 0–0.2)
IMM GRANULOCYTES NFR BLD AUTO: 1 % (ref 0–2)
LIPASE SERPL-CCNC: 40 U/L (ref 73–393)
LYMPHOCYTES # BLD AUTO: 1 THOUSANDS/ΜL (ref 0.6–4.47)
LYMPHOCYTES NFR BLD AUTO: 7 % (ref 14–44)
MCH RBC QN AUTO: 31 PG (ref 26.8–34.3)
MCHC RBC AUTO-ENTMCNC: 33.9 G/DL (ref 31.4–37.4)
MCV RBC AUTO: 92 FL (ref 82–98)
MONOCYTES # BLD AUTO: 0.42 THOUSAND/ΜL (ref 0.17–1.22)
MONOCYTES NFR BLD AUTO: 3 % (ref 4–12)
NEUTROPHILS # BLD AUTO: 12.97 THOUSANDS/ΜL (ref 1.85–7.62)
NEUTS SEG NFR BLD AUTO: 89 % (ref 43–75)
NRBC BLD AUTO-RTO: 0 /100 WBCS
PLATELET # BLD AUTO: 275 THOUSANDS/UL (ref 149–390)
PMV BLD AUTO: 9.6 FL (ref 8.9–12.7)
POTASSIUM SERPL-SCNC: 4.2 MMOL/L (ref 3.5–5.3)
PROT SERPL-MCNC: 7.7 G/DL (ref 6.4–8.2)
RBC # BLD AUTO: 4.68 MILLION/UL (ref 3.88–5.62)
SODIUM SERPL-SCNC: 140 MMOL/L (ref 136–145)
WBC # BLD AUTO: 14.49 THOUSAND/UL (ref 4.31–10.16)

## 2021-12-05 PROCEDURE — 96365 THER/PROPH/DIAG IV INF INIT: CPT

## 2021-12-05 PROCEDURE — 83690 ASSAY OF LIPASE: CPT

## 2021-12-05 PROCEDURE — 99283 EMERGENCY DEPT VISIT LOW MDM: CPT

## 2021-12-05 PROCEDURE — 85025 COMPLETE CBC W/AUTO DIFF WBC: CPT

## 2021-12-05 PROCEDURE — 96375 TX/PRO/DX INJ NEW DRUG ADDON: CPT

## 2021-12-05 PROCEDURE — 36415 COLL VENOUS BLD VENIPUNCTURE: CPT

## 2021-12-05 PROCEDURE — 80053 COMPREHEN METABOLIC PANEL: CPT

## 2021-12-05 PROCEDURE — 99284 EMERGENCY DEPT VISIT MOD MDM: CPT | Performed by: EMERGENCY MEDICINE

## 2021-12-05 RX ORDER — ONDANSETRON 2 MG/ML
4 INJECTION INTRAMUSCULAR; INTRAVENOUS ONCE
Status: COMPLETED | OUTPATIENT
Start: 2021-12-05 | End: 2021-12-05

## 2021-12-05 RX ORDER — KETOROLAC TROMETHAMINE 30 MG/ML
15 INJECTION, SOLUTION INTRAMUSCULAR; INTRAVENOUS ONCE
Status: DISCONTINUED | OUTPATIENT
Start: 2021-12-05 | End: 2021-12-05

## 2021-12-05 RX ORDER — ONDANSETRON 4 MG/1
4 TABLET, FILM COATED ORAL EVERY 6 HOURS
Qty: 12 TABLET | Refills: 0 | Status: SHIPPED | OUTPATIENT
Start: 2021-12-05

## 2021-12-05 RX ORDER — SODIUM CHLORIDE, SODIUM GLUCONATE, SODIUM ACETATE, POTASSIUM CHLORIDE, MAGNESIUM CHLORIDE, SODIUM PHOSPHATE, DIBASIC, AND POTASSIUM PHOSPHATE .53; .5; .37; .037; .03; .012; .00082 G/100ML; G/100ML; G/100ML; G/100ML; G/100ML; G/100ML; G/100ML
1000 INJECTION, SOLUTION INTRAVENOUS ONCE
Status: COMPLETED | OUTPATIENT
Start: 2021-12-05 | End: 2021-12-05

## 2021-12-05 RX ORDER — ONDANSETRON 2 MG/ML
4 INJECTION INTRAMUSCULAR; INTRAVENOUS ONCE
Status: DISCONTINUED | OUTPATIENT
Start: 2021-12-05 | End: 2021-12-05

## 2021-12-05 RX ADMIN — ONDANSETRON 4 MG: 2 INJECTION INTRAMUSCULAR; INTRAVENOUS at 16:00

## 2021-12-05 RX ADMIN — SODIUM CHLORIDE, SODIUM GLUCONATE, SODIUM ACETATE, POTASSIUM CHLORIDE, MAGNESIUM CHLORIDE, SODIUM PHOSPHATE, DIBASIC, AND POTASSIUM PHOSPHATE 1000 ML: .53; .5; .37; .037; .03; .012; .00082 INJECTION, SOLUTION INTRAVENOUS at 16:00

## 2022-09-29 ENCOUNTER — HOSPITAL ENCOUNTER (EMERGENCY)
Facility: HOSPITAL | Age: 22
Discharge: HOME | End: 2022-09-29
Attending: EMERGENCY MEDICINE

## 2022-09-29 VITALS
DIASTOLIC BLOOD PRESSURE: 71 MMHG | SYSTOLIC BLOOD PRESSURE: 118 MMHG | OXYGEN SATURATION: 98 % | TEMPERATURE: 98.1 F | HEART RATE: 76 BPM | RESPIRATION RATE: 20 BRPM

## 2022-09-29 DIAGNOSIS — B34.9 VIRAL SYNDROME: Primary | ICD-10-CM

## 2022-09-29 LAB — SARS-COV-2 RNA RESP QL NAA+PROBE: POSITIVE

## 2022-09-29 PROCEDURE — 99284 EMERGENCY DEPT VISIT MOD MDM: CPT | Performed by: PHYSICIAN ASSISTANT

## 2022-09-29 PROCEDURE — 99283 EMERGENCY DEPT VISIT LOW MDM: CPT

## 2022-09-29 PROCEDURE — U0003 INFECTIOUS AGENT DETECTION BY NUCLEIC ACID (DNA OR RNA); SEVERE ACUTE RESPIRATORY SYNDROME CORONAVIRUS 2 (SARS-COV-2) (CORONAVIRUS DISEASE [COVID-19]), AMPLIFIED PROBE TECHNIQUE, MAKING USE OF HIGH THROUGHPUT TECHNOLOGIES AS DESCRIBED BY CMS-2020-01-R: HCPCS | Performed by: PHYSICIAN ASSISTANT

## 2022-09-29 PROCEDURE — U0005 INFEC AGEN DETEC AMPLI PROBE: HCPCS | Performed by: PHYSICIAN ASSISTANT

## 2022-09-29 RX ORDER — IBUPROFEN 600 MG/1
600 TABLET ORAL ONCE
Status: COMPLETED | OUTPATIENT
Start: 2022-09-29 | End: 2022-09-29

## 2022-09-29 RX ORDER — DEXTROMETHORPHAN HYDROBROMIDE AND PROMETHAZINE HYDROCHLORIDE 15; 6.25 MG/5ML; MG/5ML
5 SOLUTION ORAL 4 TIMES DAILY PRN
Qty: 118 ML | Refills: 0 | Status: SHIPPED | OUTPATIENT
Start: 2022-09-29 | End: 2022-10-06

## 2022-09-29 RX ADMIN — IBUPROFEN 600 MG: 600 TABLET, FILM COATED ORAL at 11:18

## 2022-09-29 NOTE — DISCHARGE INSTRUCTIONS
Return with any worsening symptoms questions comments or concerns    Follow-up with your family doctor for ongoing care on re-evaluation     We will call your COVID test is positive

## 2022-09-29 NOTE — Clinical Note
Harjinder Moser was seen and treated in our emergency department on 9/29/2022  Diagnosis: covid test pending    Jania Hou    He may return on this date: 10/01/2022         If you have any questions or concerns, please don't hesitate to call        Baldemar Diaz PA-C    ______________________________           _______________          _______________  Hospital Representative                              Date                                Time

## 2022-09-29 NOTE — Clinical Note
Emanuel Hannah was seen and treated in our emergency department on 9/29/2022  Diagnosis: covid test pending    Radha Paniagua    He may return on this date: 10/01/2022         If you have any questions or concerns, please don't hesitate to call        Michelle Astorga PA-C    ______________________________           _______________          _______________  Hospital Representative                              Date                                Time

## 2022-09-29 NOTE — ED PROVIDER NOTES
History  Chief Complaint   Patient presents with    Chills     Patient reports chills, headache, bodyaches since midnight  Had a friend who visited him yesterday who was not feeling well  This is a 53-year-old male patient started this morning with body aches a cough and chills  Has taken nothing for this  No blurred vision no double vision no headache no chest pain or shortness of breath no nausea vomiting diarrhea abdominal pain  No pleuritic pain  He is not tachypneic tachycardic or hypoxic  A friend his has similar symptoms  No sore throat no rash no stiff neck  Differential diagnosis includes not limited to COVID, viral illness, pneumonia less likely          Prior to Admission Medications   Prescriptions Last Dose Informant Patient Reported? Taking?   dicyclomine (BENTYL) 20 mg tablet   No No   Sig: Take 1 tablet (20 mg total) by mouth 2 (two) times a day   ondansetron (ZOFRAN) 4 mg tablet   No No   Sig: Take 1 tablet (4 mg total) by mouth every 6 (six) hours   ondansetron (ZOFRAN) 4 mg tablet   No No   Sig: Take 1 tablet (4 mg total) by mouth every 6 (six) hours   ondansetron (ZOFRAN-ODT) 4 mg disintegrating tablet   No No   Sig: Take 1 tablet (4 mg total) by mouth every 6 (six) hours as needed for nausea or vomiting      Facility-Administered Medications: None       Past Medical History:   Diagnosis Date    Asthma        Past Surgical History:   Procedure Laterality Date    EAR TUBE REMOVAL      TONSILLECTOMY         History reviewed  No pertinent family history  I have reviewed and agree with the history as documented      E-Cigarette/Vaping    E-Cigarette Use Never User      E-Cigarette/Vaping Substances    Nicotine No     THC No     CBD No     Flavoring No     Other No     Unknown No      Social History     Tobacco Use    Smoking status: Never Smoker    Smokeless tobacco: Never Used   Vaping Use    Vaping Use: Never used   Substance Use Topics    Alcohol use: No    Drug use: No       Review of Systems   Constitutional: Positive for chills and fever  Negative for diaphoresis and fatigue  HENT: Negative  Negative for congestion, ear pain, nosebleeds and sore throat  Eyes: Negative for photophobia, pain, discharge and visual disturbance  Respiratory: Positive for cough  Negative for choking, chest tightness, shortness of breath and wheezing  Cardiovascular: Negative for chest pain and palpitations  Gastrointestinal: Negative for abdominal distention, abdominal pain, diarrhea and vomiting  Genitourinary: Negative for dysuria, flank pain, frequency and hematuria  Musculoskeletal: Positive for myalgias  Negative for arthralgias, back pain, gait problem, joint swelling, neck pain and neck stiffness  Skin: Negative for color change and rash  Neurological: Negative for dizziness, seizures, syncope and headaches  Psychiatric/Behavioral: Negative for behavioral problems and confusion  The patient is not nervous/anxious  All other systems reviewed and are negative  Physical Exam  Physical Exam  Vitals and nursing note reviewed  Constitutional:       General: He is not in acute distress  Appearance: Normal appearance  He is well-developed  He is not ill-appearing, toxic-appearing or diaphoretic  HENT:      Head: Normocephalic and atraumatic  Right Ear: Tympanic membrane, ear canal and external ear normal       Left Ear: Tympanic membrane, ear canal and external ear normal       Nose: Nose normal       Mouth/Throat:      Mouth: Mucous membranes are moist       Pharynx: Oropharynx is clear  No oropharyngeal exudate or posterior oropharyngeal erythema  Eyes:      General: No scleral icterus  Right eye: No discharge  Left eye: No discharge  Conjunctiva/sclera: Conjunctivae normal       Pupils: Pupils are equal, round, and reactive to light  Cardiovascular:      Rate and Rhythm: Normal rate and regular rhythm     Pulmonary:      Effort: Pulmonary effort is normal       Breath sounds: Normal breath sounds  Abdominal:      General: Bowel sounds are normal       Palpations: Abdomen is soft  Tenderness: There is no abdominal tenderness  Musculoskeletal:         General: Normal range of motion  Cervical back: Normal range of motion and neck supple  Right lower leg: No edema  Left lower leg: No edema  Skin:     General: Skin is warm  Capillary Refill: Capillary refill takes less than 2 seconds  Neurological:      General: No focal deficit present  Mental Status: He is alert and oriented to person, place, and time  Mental status is at baseline  Psychiatric:         Mood and Affect: Mood normal          Behavior: Behavior normal          Vital Signs  ED Triage Vitals [09/29/22 1057]   Temperature Pulse Respirations Blood Pressure SpO2   98 1 °F (36 7 °C) 76 20 118/71 98 %      Temp Source Heart Rate Source Patient Position - Orthostatic VS BP Location FiO2 (%)   Oral Monitor Lying Right arm --      Pain Score       8           Vitals:    09/29/22 1057   BP: 118/71   Pulse: 76   Patient Position - Orthostatic VS: Lying         Visual Acuity      ED Medications  Medications   ibuprofen (MOTRIN) tablet 600 mg (600 mg Oral Given 9/29/22 1118)       Diagnostic Studies  Results Reviewed     Procedure Component Value Units Date/Time    COVID only [[id number]]  (Abnormal) Collected: 09/29/22 1117    Lab Status: Final result Specimen: Nares from Nose Updated: 09/29/22 1205     SARS-CoV-2 Positive    Narrative:      FOR PEDIATRIC PATIENTS - copy/paste COVID Guidelines URL to browser: [email or web address]    SARS-CoV-2 assay is a Nucleic Acid Amplification assay intended for the  qualitative detection of nucleic acid from SARS-CoV-2 in nasopharyngeal  swabs  Results are for the presumptive identification of SARS-CoV-2 RNA      Positive results are indicative of infection with SARS-CoV-2, the virus  causing COVID-19, but do not rule out bacterial infection or co-infection  with other viruses  Laboratories within the [place] and its  territories are required to report all positive results to the appropriate  public health authorities  Negative results do not preclude SARS-CoV-2  infection and should not be used as the sole basis for treatment or other  patient management decisions  Negative results must be combined with  clinical observations, patient history, and epidemiological information  This test has not been FDA cleared or approved  This test has been authorized by FDA under an Emergency Use Authorization  (EUA)  This test is only authorized for the duration of time the  declaration that circumstances exist justifying the authorization of the  emergency use of an in vitro diagnostic tests for detection of SARS-CoV-2  virus and/or diagnosis of COVID-19 infection under section 564(b)(1) of  the Act, 21 U  S C  585OHD-9(E)(5), unless the authorization is terminated  or revoked sooner  The test has been validated but independent review by FDA  and CLIA is pending  Test performed using [employer]: This RT-PCR assay targets N2,  a region unique to SARS-CoV-2  A conserved region in the E-gene was chosen  for pan-Sarbecovirus detection which includes SARS-CoV-2  According to CMS-2020-01-R, this platform meets the definition of high-throughput technology                   No orders to display              Procedures  Procedures         ED Course                                             MDM    Disposition  Final diagnoses:   Viral syndrome     Time reflects when diagnosis was documented in both MDM as applicable and the Disposition within this note     Time User Action Codes Description Comment    9/29/2022 11:10 AM Efrain Horton Add [B34 9] Viral syndrome       ED Disposition     ED Disposition   Discharge    Condition   Stable    Date/Time   Thu Sep 29, 2022 11:10 AM    Comment   Lulu Stable discharge to home/self care  Follow-up Information     Follow up With Specialties Details Why Contact Info    Keshia Vieira MD Family Medicine Schedule an appointment as soon as possible for a visit   [phone]  [address] [address]  [phone]            Discharge Medication List as of 9/29/2022 11:14 AM      START taking these medications    Details   Promethazine-DM (PHENERGAN-DM) 6 25-15 mg/5 mL oral syrup Take 5 mL by mouth 4 (four) times a day as needed for cough for up to 7 days, Starting Thu 9/29/2022, Until Thu 10/6/2022 at 2359, Normal         CONTINUE these medications which have NOT CHANGED    Details   dicyclomine (BENTYL) 20 mg tablet Take 1 tablet (20 mg total) by mouth 2 (two) times a day, Starting Wed 5/27/2020, Normal      !! ondansetron (ZOFRAN) 4 mg tablet Take 1 tablet (4 mg total) by mouth every 6 (six) hours, Starting Sat 1/30/2021, Normal      !! ondansetron (ZOFRAN) 4 mg tablet Take 1 tablet (4 mg total) by mouth every 6 (six) hours, Starting Sun 12/5/2021, Normal      ondansetron (ZOFRAN-ODT) 4 mg disintegrating tablet Take 1 tablet (4 mg total) by mouth every 6 (six) hours as needed for nausea or vomiting, Starting Wed 5/27/2020, Normal       !! - Potential duplicate medications found  Please discuss with provider  No discharge procedures on file      PDMP Review     None          ED Provider  Electronically Signed by           Ese Cole PA-C  09/29/22 4320

## 2023-03-05 ENCOUNTER — APPOINTMENT (EMERGENCY)
Dept: RADIOLOGY | Facility: HOSPITAL | Age: 23
End: 2023-03-05

## 2023-03-05 ENCOUNTER — HOSPITAL ENCOUNTER (EMERGENCY)
Facility: HOSPITAL | Age: 23
Discharge: HOME | End: 2023-03-05
Attending: EMERGENCY MEDICINE

## 2023-03-05 VITALS
OXYGEN SATURATION: 99 % | TEMPERATURE: 98 F | SYSTOLIC BLOOD PRESSURE: 122 MMHG | HEART RATE: 74 BPM | RESPIRATION RATE: 20 BRPM | DIASTOLIC BLOOD PRESSURE: 77 MMHG

## 2023-03-05 DIAGNOSIS — R04.0 EPISTAXIS: ICD-10-CM

## 2023-03-05 DIAGNOSIS — S02.2XXA NASAL BONE FRACTURE: Primary | ICD-10-CM

## 2023-03-05 RX ORDER — OXYMETAZOLINE HYDROCHLORIDE 0.05 G/100ML
2 SPRAY NASAL ONCE
Status: COMPLETED | OUTPATIENT
Start: 2023-03-05 | End: 2023-03-05

## 2023-03-05 RX ORDER — IBUPROFEN 600 MG/1
600 TABLET ORAL ONCE
Status: COMPLETED | OUTPATIENT
Start: 2023-03-05 | End: 2023-03-05

## 2023-03-05 RX ORDER — ACETAMINOPHEN 325 MG/1
975 TABLET ORAL ONCE
Status: COMPLETED | OUTPATIENT
Start: 2023-03-05 | End: 2023-03-05

## 2023-03-05 RX ORDER — CETIRIZINE HYDROCHLORIDE 10 MG/1
10 TABLET ORAL DAILY
Qty: 14 TABLET | Refills: 0 | Status: SHIPPED | OUTPATIENT
Start: 2023-03-05 | End: 2023-03-19

## 2023-03-05 RX ORDER — OXYCODONE HYDROCHLORIDE 5 MG/1
5 TABLET ORAL ONCE
Status: COMPLETED | OUTPATIENT
Start: 2023-03-05 | End: 2023-03-05

## 2023-03-05 RX ADMIN — OXYCODONE HYDROCHLORIDE 5 MG: 5 TABLET ORAL at 02:26

## 2023-03-05 RX ADMIN — OXYMETAZOLINE HYDROCHLORIDE 2 SPRAY: 0.05 SPRAY NASAL at 03:47

## 2023-03-05 RX ADMIN — IBUPROFEN 600 MG: 600 TABLET, FILM COATED ORAL at 02:26

## 2023-03-05 RX ADMIN — ACETAMINOPHEN 975 MG: 325 TABLET ORAL at 02:26
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
## 2023-03-05 NOTE — DISCHARGE INSTRUCTIONS
Take the Zyrtec for the next 2 weeks  You can also use pseudoephedrine to help treat any congestion  If you use the Afrin, DO NOT USE FOR MORE THAN 3 DAYS  You can have a rebound nasal congestion that is often worse  Take Tylenol and ibuprofen for your pain  Ice will also help your pain  Follow up with the ENT doctors  If you have any new or worsening symptoms, please return to your nearest ER
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
## 2023-09-18 ENCOUNTER — HOSPITAL ENCOUNTER (EMERGENCY)
Facility: HOSPITAL | Age: 23
Discharge: HOME | End: 2023-09-19
Attending: EMERGENCY MEDICINE

## 2023-09-18 VITALS
DIASTOLIC BLOOD PRESSURE: 58 MMHG | TEMPERATURE: 98.3 F | SYSTOLIC BLOOD PRESSURE: 128 MMHG | OXYGEN SATURATION: 98 % | RESPIRATION RATE: 18 BRPM | HEART RATE: 84 BPM

## 2023-09-18 DIAGNOSIS — S99.929A FOOT INJURY: ICD-10-CM

## 2023-09-18 DIAGNOSIS — M79.672 LEFT FOOT PAIN: ICD-10-CM

## 2023-09-18 DIAGNOSIS — T14.8XXA CONTUSION: Primary | ICD-10-CM

## 2023-09-18 PROCEDURE — 99283 EMERGENCY DEPT VISIT LOW MDM: CPT

## 2023-09-18 NOTE — Clinical Note
Kelin Lawrence was seen and treated in our emergency department on 9/18/2023. Diagnosis: left foot/ankle injury    Yury Greenfield  is off the rest of the shift today, may return to work on return date, may return to gym class or sports after being cleared by physician. He may return on this date: 09/21/2023         If you have any questions or concerns, please don't hesitate to call.       Caleb Goss MD    ______________________________           _______________          _______________  MONTRELL GARY FABIEN [facility] Representative                              Date                                Time

## 2023-09-19 ENCOUNTER — APPOINTMENT (EMERGENCY)
Dept: RADIOLOGY | Facility: HOSPITAL | Age: 23
End: 2023-09-19

## 2023-09-19 PROCEDURE — 73630 X-RAY EXAM OF FOOT: CPT

## 2023-09-19 PROCEDURE — 96372 THER/PROPH/DIAG INJ SC/IM: CPT

## 2023-09-19 PROCEDURE — 73610 X-RAY EXAM OF ANKLE: CPT

## 2023-09-19 RX ORDER — ACETAMINOPHEN 325 MG/1
650 TABLET ORAL ONCE
Status: COMPLETED | OUTPATIENT
Start: 2023-09-19 | End: 2023-09-19

## 2023-09-19 RX ORDER — KETOROLAC TROMETHAMINE 30 MG/ML
15 INJECTION, SOLUTION INTRAMUSCULAR; INTRAVENOUS ONCE
Status: COMPLETED | OUTPATIENT
Start: 2023-09-19 | End: 2023-09-19

## 2023-09-19 RX ADMIN — KETOROLAC TROMETHAMINE 15 MG: 30 INJECTION, SOLUTION INTRAMUSCULAR; INTRAVENOUS at 00:52

## 2023-09-19 RX ADMIN — ACETAMINOPHEN 650 MG: 325 TABLET, FILM COATED ORAL at 00:51
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
## 2023-09-19 NOTE — DISCHARGE INSTRUCTIONS
You were seen in the Emergency Department today for persistent left foot pain. Continue taking Tylenol and Ibuprofen as needed for pain. Please follow up with your primary care doctor as needed. Also follow up with your scheduled appointment with Sports Medicine on 9/21 for further management. Please return to the Emergency Department if you experience worsening of your current symptoms or any other concerning symptoms.

## 2024-03-22 ENCOUNTER — APPOINTMENT (EMERGENCY)
Dept: RADIOLOGY | Facility: HOSPITAL | Age: 24
End: 2024-03-22

## 2024-03-22 ENCOUNTER — HOSPITAL ENCOUNTER (EMERGENCY)
Facility: HOSPITAL | Age: 24
Discharge: HOME | End: 2024-03-22
Attending: EMERGENCY MEDICINE

## 2024-03-22 VITALS
OXYGEN SATURATION: 98 % | DIASTOLIC BLOOD PRESSURE: 54 MMHG | HEART RATE: 72 BPM | SYSTOLIC BLOOD PRESSURE: 104 MMHG | TEMPERATURE: 97.9 F | RESPIRATION RATE: 18 BRPM

## 2024-03-22 DIAGNOSIS — K52.9 GASTROENTERITIS: Primary | ICD-10-CM

## 2024-03-22 LAB
ALBUMIN SERPL BCP-MCNC: 4.7 G/DL (ref 3.5–5)
ALP SERPL-CCNC: 95 U/L (ref 34–104)
ALT SERPL W P-5'-P-CCNC: 15 U/L (ref 7–52)
ANION GAP SERPL CALCULATED.3IONS-SCNC: 9 MMOL/L (ref 4–13)
AST SERPL W P-5'-P-CCNC: 19 U/L (ref 13–39)
BASOPHILS # BLD AUTO: 0.05 THOUSANDS/ÂΜL (ref 0–0.1)
BASOPHILS NFR BLD AUTO: 0 % (ref 0–1)
BILIRUB SERPL-MCNC: 0.76 MG/DL (ref 0.2–1)
BUN SERPL-MCNC: 22 MG/DL (ref 5–25)
CALCIUM SERPL-MCNC: 9.6 MG/DL (ref 8.4–10.2)
CHLORIDE SERPL-SCNC: 102 MMOL/L (ref 96–108)
CO2 SERPL-SCNC: 27 MMOL/L (ref 21–32)
CREAT SERPL-MCNC: 0.79 MG/DL (ref 0.6–1.3)
EOSINOPHIL # BLD AUTO: 0.09 THOUSAND/ÂΜL (ref 0–0.61)
EOSINOPHIL NFR BLD AUTO: 1 % (ref 0–6)
ERYTHROCYTE [DISTWIDTH] IN BLOOD BY AUTOMATED COUNT: 11.7 % (ref 11.6–15.1)
FLUAV RNA RESP QL NAA+PROBE: NEGATIVE
FLUBV RNA RESP QL NAA+PROBE: NEGATIVE
GFR SERPL CREATININE-BSD FRML MDRD: 123 ML/MIN/1.73SQ M
GLUCOSE SERPL-MCNC: 111 MG/DL (ref 65–140)
HCT VFR BLD AUTO: 45.9 % (ref 36.5–49.3)
HGB BLD-MCNC: 15.4 G/DL (ref 12–17)
IMM GRANULOCYTES # BLD AUTO: 0.08 THOUSAND/UL (ref 0–0.2)
IMM GRANULOCYTES NFR BLD AUTO: 0 % (ref 0–2)
LIPASE SERPL-CCNC: 17 U/L (ref 11–82)
LYMPHOCYTES # BLD AUTO: 0.86 THOUSANDS/ÂΜL (ref 0.6–4.47)
LYMPHOCYTES NFR BLD AUTO: 5 % (ref 14–44)
MCH RBC QN AUTO: 30.7 PG (ref 26.8–34.3)
MCHC RBC AUTO-ENTMCNC: 33.6 G/DL (ref 31.4–37.4)
MCV RBC AUTO: 92 FL (ref 82–98)
MONOCYTES # BLD AUTO: 1.07 THOUSAND/ÂΜL (ref 0.17–1.22)
MONOCYTES NFR BLD AUTO: 6 % (ref 4–12)
NEUTROPHILS # BLD AUTO: 16.43 THOUSANDS/ÂΜL (ref 1.85–7.62)
NEUTS SEG NFR BLD AUTO: 88 % (ref 43–75)
NRBC BLD AUTO-RTO: 0 /100 WBCS
PLATELET # BLD AUTO: 301 THOUSANDS/UL (ref 149–390)
PMV BLD AUTO: 9.8 FL (ref 8.9–12.7)
POTASSIUM SERPL-SCNC: 4 MMOL/L (ref 3.5–5.3)
PROT SERPL-MCNC: 7.5 G/DL (ref 6.4–8.4)
RBC # BLD AUTO: 5.01 MILLION/UL (ref 3.88–5.62)
RSV RNA RESP QL NAA+PROBE: NEGATIVE
SARS-COV-2 RNA RESP QL NAA+PROBE: NEGATIVE
SODIUM SERPL-SCNC: 138 MMOL/L (ref 135–147)
WBC # BLD AUTO: 18.58 THOUSAND/UL (ref 4.31–10.16)

## 2024-03-22 PROCEDURE — 96361 HYDRATE IV INFUSION ADD-ON: CPT

## 2024-03-22 PROCEDURE — 36415 COLL VENOUS BLD VENIPUNCTURE: CPT

## 2024-03-22 PROCEDURE — 71045 X-RAY EXAM CHEST 1 VIEW: CPT

## 2024-03-22 PROCEDURE — 99285 EMERGENCY DEPT VISIT HI MDM: CPT | Performed by: EMERGENCY MEDICINE

## 2024-03-22 PROCEDURE — 80053 COMPREHEN METABOLIC PANEL: CPT

## 2024-03-22 PROCEDURE — 83690 ASSAY OF LIPASE: CPT

## 2024-03-22 PROCEDURE — 96374 THER/PROPH/DIAG INJ IV PUSH: CPT

## 2024-03-22 PROCEDURE — 96375 TX/PRO/DX INJ NEW DRUG ADDON: CPT

## 2024-03-22 PROCEDURE — 99283 EMERGENCY DEPT VISIT LOW MDM: CPT

## 2024-03-22 PROCEDURE — 0241U HB NFCT DS VIR RESP RNA 4 TRGT: CPT

## 2024-03-22 PROCEDURE — 85025 COMPLETE CBC W/AUTO DIFF WBC: CPT

## 2024-03-22 RX ORDER — ONDANSETRON 2 MG/ML
4 INJECTION INTRAMUSCULAR; INTRAVENOUS ONCE
Status: COMPLETED | OUTPATIENT
Start: 2024-03-22 | End: 2024-03-22

## 2024-03-22 RX ORDER — KETOROLAC TROMETHAMINE 30 MG/ML
15 INJECTION, SOLUTION INTRAMUSCULAR; INTRAVENOUS ONCE
Status: COMPLETED | OUTPATIENT
Start: 2024-03-22 | End: 2024-03-22

## 2024-03-22 RX ORDER — DICYCLOMINE HCL 20 MG
20 TABLET ORAL 2 TIMES DAILY
Qty: 20 TABLET | Refills: 0 | Status: SHIPPED | OUTPATIENT
Start: 2024-03-22

## 2024-03-22 RX ORDER — DICYCLOMINE HCL 20 MG
20 TABLET ORAL ONCE
Status: COMPLETED | OUTPATIENT
Start: 2024-03-22 | End: 2024-03-22

## 2024-03-22 RX ORDER — ONDANSETRON 4 MG/1
4 TABLET, FILM COATED ORAL EVERY 6 HOURS
Qty: 12 TABLET | Refills: 0 | Status: SHIPPED | OUTPATIENT
Start: 2024-03-22

## 2024-03-22 RX ADMIN — KETOROLAC TROMETHAMINE 15 MG: 30 INJECTION, SOLUTION INTRAMUSCULAR; INTRAVENOUS at 11:03

## 2024-03-22 RX ADMIN — DICYCLOMINE HYDROCHLORIDE 20 MG: 20 TABLET ORAL at 09:56

## 2024-03-22 RX ADMIN — ONDANSETRON 4 MG: 2 INJECTION INTRAMUSCULAR; INTRAVENOUS at 09:55

## 2024-03-22 RX ADMIN — SODIUM CHLORIDE 1000 ML: 0.9 INJECTION, SOLUTION INTRAVENOUS at 09:53

## 2024-03-22 NOTE — DISCHARGE INSTRUCTIONS
You were seen in the ED for vomiting and diarrhea. Return to the ED for any worsening symptoms or new symptoms. Follow up with your primary care doctor as soon as possible.  You can take imodium for your diarrhea

## 2024-03-22 NOTE — ED PROVIDER NOTES
History  Chief Complaint   Patient presents with    Vomiting     Has been vomiting since 2am, not able to keep anything down.      24-year-old male patient with no past medical history, no abdominal surgical history presents to the ED complaining of nausea vomiting diarrhea, abdominal pain onset 2 AM today.  Patient states that it started with diarrhea and then he started having abdominal pain that is generalized but in worsening upper.  Patient also states that he had 4-5 episodes of nonbilious, nonbloody vomiting.  Patient states that he has some chills but denies any fevers, coughing, chest pain but states that he has some mild shortness of breath.  Denies any urinary symptoms.  Patient states that he tried taking pepto bismal but threw it up.        None       Past Medical History:   Diagnosis Date    Asthma        Past Surgical History:   Procedure Laterality Date    EAR TUBE REMOVAL      TONSILLECTOMY         History reviewed. No pertinent family history.  I have reviewed and agree with the history as documented.    E-Cigarette/Vaping    E-Cigarette Use Never User      E-Cigarette/Vaping Substances    Nicotine No     THC No     CBD No     Flavoring No     Other No     Unknown No      Social History     Tobacco Use    Smoking status: Never    Smokeless tobacco: Never   Vaping Use    Vaping status: Never Used   Substance Use Topics    Alcohol use: No    Drug use: No        Review of Systems   Gastrointestinal:  Positive for abdominal pain, nausea and vomiting.   All other systems reviewed and are negative.      Physical Exam  ED Triage Vitals   Temperature Pulse Respirations Blood Pressure SpO2   03/22/24 0941 03/22/24 0941 03/22/24 0941 03/22/24 0941 03/22/24 0941   97.9 °F (36.6 °C) 105 18 132/61 98 %      Temp Source Heart Rate Source Patient Position - Orthostatic VS BP Location FiO2 (%)   03/22/24 0941 03/22/24 0941 03/22/24 0941 03/22/24 0941 --   Temporal Monitor Sitting Left arm       Pain Score        03/22/24 1015       No Pain             Orthostatic Vital Signs  Vitals:    03/22/24 0941 03/22/24 1015 03/22/24 1100   BP: 132/61 124/60 104/54   Pulse: 105 70 72   Patient Position - Orthostatic VS: Sitting Sitting Sitting       Physical Exam  Vitals reviewed.   Constitutional:       Appearance: Normal appearance.   HENT:      Head: Normocephalic and atraumatic.      Nose: Nose normal.      Mouth/Throat:      Mouth: Mucous membranes are moist.      Pharynx: Oropharynx is clear.   Eyes:      Extraocular Movements: Extraocular movements intact.      Conjunctiva/sclera: Conjunctivae normal.   Cardiovascular:      Rate and Rhythm: Normal rate and regular rhythm.      Pulses: Normal pulses.      Heart sounds: Normal heart sounds.   Pulmonary:      Effort: Pulmonary effort is normal.      Breath sounds: Normal breath sounds.   Abdominal:      General: Bowel sounds are normal.      Palpations: Abdomen is soft.      Tenderness: There is abdominal tenderness (generalized abd tenderness).   Musculoskeletal:         General: Normal range of motion.      Cervical back: Normal range of motion.   Skin:     General: Skin is warm and dry.   Neurological:      General: No focal deficit present.      Mental Status: He is alert and oriented to person, place, and time. Mental status is at baseline.         ED Medications  Medications   sodium chloride 0.9 % bolus 1,000 mL (0 mL Intravenous Stopped 3/22/24 1103)   dicyclomine (BENTYL) tablet 20 mg (20 mg Oral Given 3/22/24 0956)   ondansetron (ZOFRAN) injection 4 mg (4 mg Intravenous Given 3/22/24 0955)   ketorolac (TORADOL) injection 15 mg (15 mg Intravenous Given 3/22/24 1103)       Diagnostic Studies  Results Reviewed       Procedure Component Value Units Date/Time    COVID/FLU/RSV [[id number]]  (Normal) Collected: 03/22/24 0956    Lab Status: Final result Specimen: Nares from Nose Updated: 03/22/24 1136     SARS-CoV-2 Negative     INFLUENZA A PCR Negative     INFLUENZA B PCR  Negative     RSV PCR Negative    Narrative:      FOR PEDIATRIC PATIENTS - copy/paste COVID Guidelines URL to browser: [email or web address]    SARS-CoV-2 assay is a Nucleic Acid Amplification assay intended for the  qualitative detection of nucleic acid from SARS-CoV-2 in nasopharyngeal  swabs. Results are for the presumptive identification of SARS-CoV-2 RNA.    Positive results are indicative of infection with SARS-CoV-2, the virus  causing COVID-19, but do not rule out bacterial infection or co-infection  with other viruses. Laboratories within the [place] and its  territories are required to report all positive results to the appropriate  public health authorities. Negative results do not preclude SARS-CoV-2  infection and should not be used as the sole basis for treatment or other  patient management decisions. Negative results must be combined with  clinical observations, patient history, and epidemiological information.  This test has not been FDA cleared or approved.    This test has been authorized by FDA under an Emergency Use Authorization  (EUA). This test is only authorized for the duration of time the  declaration that circumstances exist justifying the authorization of the  emergency use of an in vitro diagnostic tests for detection of SARS-CoV-2  virus and/or diagnosis of COVID-19 infection under section 564(b)(1) of  the Act, 21 U.S.C. 360bbb-3(b)(1), unless the authorization is terminated  or revoked sooner. The test has been validated but independent review by FDA  and CLIA is pending.    Test performed using [employer] GeneXpert: This RT-PCR assay targets N2,  a region unique to SARS-CoV-2. A conserved region in the E-gene was chosen  for pan-Sarbecovirus detection which includes SARS-CoV-2.    According to CMS-2020-01-R, this platform meets the definition of high-throughput technology.    Comprehensive metabolic panel [[id number]] Collected:  03/22/24 0956    Lab Status: Final result Specimen: Blood from Arm, Right Updated: 03/22/24 1042     Sodium 138 mmol/L      Potassium 4.0 mmol/L      Chloride 102 mmol/L      CO2 27 mmol/L      ANION GAP 9 mmol/L      BUN 22 mg/dL      Creatinine 0.79 mg/dL      Glucose 111 mg/dL      Calcium 9.6 mg/dL      AST 19 U/L      ALT 15 U/L      Alkaline Phosphatase 95 U/L      Total Protein 7.5 g/dL      Albumin 4.7 g/dL      Total Bilirubin 0.76 mg/dL      eGFR 123 ml/min/1.73sq m     Narrative:      National Kidney Disease Foundation guidelines for Chronic Kidney Disease (CKD):     Stage 1 with normal or high GFR (GFR > 90 mL/min/1.73 square meters)    Stage 2 Mild CKD (GFR = 60-89 mL/min/1.73 square meters)    Stage 3A Moderate CKD (GFR = 45-59 mL/min/1.73 square meters)    Stage 3B Moderate CKD (GFR = 30-44 mL/min/1.73 square meters)    Stage 4 Severe CKD (GFR = 15-29 mL/min/1.73 square meters)    Stage 5 End Stage CKD (GFR <15 mL/min/1.73 square meters)  Note: GFR calculation is accurate only with a steady state creatinine    Lipase [[id number]]  (Normal) Collected: 03/22/24 0956    Lab Status: Final result Specimen: Blood from Arm, Right Updated: 03/22/24 1042     Lipase 17 u/L     CBC and differential [[id number]]  (Abnormal) Collected: 03/22/24 0956    Lab Status: Final result Specimen: Blood from Arm, Right Updated: 03/22/24 1008     WBC 18.58 Thousand/uL      RBC 5.01 Million/uL      Hemoglobin 15.4 g/dL      Hematocrit 45.9 %      MCV 92 fL      MCH 30.7 pg      MCHC 33.6 g/dL      RDW 11.7 %      MPV 9.8 fL      Platelets 301 Thousands/uL      nRBC 0 /100 WBCs      Neutrophils Relative 88 %      Immature Grans % 0 %      Lymphocytes Relative 5 %      Monocytes Relative 6 %      Eosinophils Relative 1 %      Basophils Relative 0 %      Neutrophils Absolute 16.43 Thousands/µL      Absolute Immature Grans 0.08 Thousand/uL      Absolute Lymphocytes 0.86 Thousands/µL      Absolute Monocytes 1.07 Thousand/µL       "Eosinophils Absolute 0.09 Thousand/µL      Basophils Absolute 0.05 Thousands/µL                    XR chest 1 view portable   ED Interpretation by Nabeel Eli MD (03/22 1058)   No acute cardiopulm disease            Procedures  Procedures      ED Course         CRAFFT      Flowsheet Row Most Recent Value   CRAFFT Initial Screen: During the past 12 months, did you:    1. Drink any alcohol (more than a few sips)?  No Filed at: 03/22/2024 0951   2. Smoke any marijuana or hashish No Filed at: 03/22/2024 0951   3. Use anything else to get high? (\"anything else\" includes illegal drugs, over the counter and prescription drugs, and things that you sniff or 'salas')? No Filed at: 03/22/2024 0951                            SBIRT 22yo+      Flowsheet Row Most Recent Value   Initial Alcohol Screen: US AUDIT-C     1. How often do you have a drink containing alcohol? 0 Filed at: 03/22/2024 0951   2. How many drinks containing alcohol do you have on a typical day you are drinking?  0 Filed at: 03/22/2024 0951   3a. Male UNDER 65: How often do you have five or more drinks on one occasion? 0 Filed at: 03/22/2024 0951   3b. FEMALE Any Age, or MALE 65+: How often do you have 4 or more drinks on one occassion? 0 Filed at: 03/22/2024 0951   Audit-C Score 0 Filed at: 03/22/2024 0951   REAL: How many times in the past year have you...    Used an illegal drug or used a prescription medication for non-medical reasons? Never Filed at: 03/22/2024 0951                  Medical Decision Making  24-year-old male patient presenting with nausea vomiting diarrhea and abdominal pain.  Patient states that this started at 2 AM.  DDx includes gastroenteritis, pancreatitis, viral syndrome.  This is likely viral gastroenteritis.  Patient treated with Toradol, fluids, Bentyl and Zofran with improvement of symptoms.  Will treat with Bentyl and Zofran for at home.  Patient states symptoms improved with fluids.  Stable for discharge with follow-up with PCP " peer return precautions given.    Amount and/or Complexity of Data Reviewed  Labs: ordered.  Radiology: ordered and independent interpretation performed.    Risk  Prescription drug management.          Disposition  Final diagnoses:   Gastroenteritis     Time reflects when diagnosis was documented in both MDM as applicable and the Disposition within this note       Time User Action Codes Description Comment    3/22/2024 11:01 AM Nabeel Eli Add [K52.9] Gastroenteritis           ED Disposition       ED Disposition   Discharge    Condition   Stable    Date/Time   Fri Mar 22, 2024 1100    Comment   Ken Samuel discharge to home/self care.                   Follow-up Information       Follow up With Specialties Details Why Contact Info    Lindsay Daniels MD Family Medicine Schedule an appointment as soon as possible for a visit   [address] [place] [address]   [id number]  [facility] [address]  [phone]              Discharge Medication List as of 3/22/2024 11:03 AM        START taking these medications    Details   dicyclomine (BENTYL) 20 mg tablet Take 1 tablet (20 mg total) by mouth 2 (two) times a day, Starting Fri 3/22/2024, Normal      ondansetron (ZOFRAN) 4 mg tablet Take 1 tablet (4 mg total) by mouth every 6 (six) hours, Starting Fri 3/22/2024, Normal           No discharge procedures on file.    PDMP Review       None             ED Provider  Attending physically available and evaluated Ken Baker. I managed the patient along with the ED Attending.    Electronically Signed by           Nabeel Eli MD  03/22/24 9525

## 2024-03-22 NOTE — ED ATTENDING ATTESTATION
3/22/2024  I, Bere Wanger MD, saw and evaluated the patient. I have discussed the patient with the resident/non-physician practitioner and agree with the resident's/non-physician practitioner's findings, Plan of Care, and MDM as documented in the resident's/non-physician practitioner's note, except where noted. All available labs and Radiology studies were reviewed.  I was present for key portions of any procedure(s) performed by the resident/non-physician practitioner and I was immediately available to provide assistance.       At this point I agree with the current assessment done in the Emergency Department.  I have conducted an independent evaluation of this patient a history and physical is as follows:    ED Course         Critical Care Time  Procedures    25 yo male with no pmh, c/o abdominal pain with n/v/d since last night. Pt with mild sob, chills. No urinary complaints.  No previous surgery.  Pt with no recent travel or abx, no known bad food exposure or sick contacts.  Vss, afebrile, tachy, lungs cta, rrr, abdomen soft mild tenderness diffusely, no rebound, no guarding.  Labs, ivf, zofran. Likley viral illness.

## 2024-05-30 ENCOUNTER — HOSPITAL ENCOUNTER (EMERGENCY)
Facility: HOSPITAL | Age: 24
Discharge: HOME | End: 2024-05-30
Attending: EMERGENCY MEDICINE

## 2024-05-30 VITALS
HEART RATE: 71 BPM | RESPIRATION RATE: 16 BRPM | DIASTOLIC BLOOD PRESSURE: 58 MMHG | TEMPERATURE: 98.3 F | SYSTOLIC BLOOD PRESSURE: 125 MMHG | OXYGEN SATURATION: 97 %

## 2024-05-30 DIAGNOSIS — R10.84 GENERALIZED ABDOMINAL PAIN: ICD-10-CM

## 2024-05-30 DIAGNOSIS — R19.7 NAUSEA VOMITING AND DIARRHEA: Primary | ICD-10-CM

## 2024-05-30 DIAGNOSIS — R11.2 NAUSEA VOMITING AND DIARRHEA: Primary | ICD-10-CM

## 2024-05-30 LAB
ALBUMIN SERPL BCP-MCNC: 4.8 G/DL (ref 3.5–5)
ALP SERPL-CCNC: 78 U/L (ref 34–104)
ALT SERPL W P-5'-P-CCNC: 12 U/L (ref 7–52)
ANION GAP SERPL CALCULATED.3IONS-SCNC: 11 MMOL/L (ref 4–13)
AST SERPL W P-5'-P-CCNC: 18 U/L (ref 13–39)
BASOPHILS # BLD AUTO: 0.04 THOUSANDS/ÂΜL (ref 0–0.1)
BASOPHILS NFR BLD AUTO: 0 % (ref 0–1)
BILIRUB SERPL-MCNC: 0.86 MG/DL (ref 0.2–1)
BUN SERPL-MCNC: 25 MG/DL (ref 5–25)
CALCIUM SERPL-MCNC: 9.5 MG/DL (ref 8.4–10.2)
CHLORIDE SERPL-SCNC: 104 MMOL/L (ref 96–108)
CO2 SERPL-SCNC: 24 MMOL/L (ref 21–32)
CREAT SERPL-MCNC: 0.77 MG/DL (ref 0.6–1.3)
EOSINOPHIL # BLD AUTO: 0.01 THOUSAND/ÂΜL (ref 0–0.61)
EOSINOPHIL NFR BLD AUTO: 0 % (ref 0–6)
ERYTHROCYTE [DISTWIDTH] IN BLOOD BY AUTOMATED COUNT: 12.1 % (ref 11.6–15.1)
GFR SERPL CREATININE-BSD FRML MDRD: 125 ML/MIN/1.73SQ M
GLUCOSE SERPL-MCNC: 97 MG/DL (ref 65–140)
HCT VFR BLD AUTO: 42.5 % (ref 36.5–49.3)
HGB BLD-MCNC: 14.5 G/DL (ref 12–17)
IMM GRANULOCYTES # BLD AUTO: 0.06 THOUSAND/UL (ref 0–0.2)
IMM GRANULOCYTES NFR BLD AUTO: 0 % (ref 0–2)
LIPASE SERPL-CCNC: 8 U/L (ref 11–82)
LYMPHOCYTES # BLD AUTO: 1.17 THOUSANDS/ÂΜL (ref 0.6–4.47)
LYMPHOCYTES NFR BLD AUTO: 8 % (ref 14–44)
MCH RBC QN AUTO: 31.4 PG (ref 26.8–34.3)
MCHC RBC AUTO-ENTMCNC: 34.1 G/DL (ref 31.4–37.4)
MCV RBC AUTO: 92 FL (ref 82–98)
MONOCYTES # BLD AUTO: 0.56 THOUSAND/ÂΜL (ref 0.17–1.22)
MONOCYTES NFR BLD AUTO: 4 % (ref 4–12)
NEUTROPHILS # BLD AUTO: 12.44 THOUSANDS/ÂΜL (ref 1.85–7.62)
NEUTS SEG NFR BLD AUTO: 88 % (ref 43–75)
NRBC BLD AUTO-RTO: 0 /100 WBCS
PLATELET # BLD AUTO: 285 THOUSANDS/UL (ref 149–390)
PMV BLD AUTO: 9.9 FL (ref 8.9–12.7)
POTASSIUM SERPL-SCNC: 3.5 MMOL/L (ref 3.5–5.3)
PROT SERPL-MCNC: 7 G/DL (ref 6.4–8.4)
RBC # BLD AUTO: 4.62 MILLION/UL (ref 3.88–5.62)
SODIUM SERPL-SCNC: 139 MMOL/L (ref 135–147)
WBC # BLD AUTO: 14.28 THOUSAND/UL (ref 4.31–10.16)

## 2024-05-30 PROCEDURE — 99283 EMERGENCY DEPT VISIT LOW MDM: CPT

## 2024-05-30 PROCEDURE — 80053 COMPREHEN METABOLIC PANEL: CPT

## 2024-05-30 PROCEDURE — 83690 ASSAY OF LIPASE: CPT

## 2024-05-30 PROCEDURE — 85025 COMPLETE CBC W/AUTO DIFF WBC: CPT

## 2024-05-30 PROCEDURE — 96361 HYDRATE IV INFUSION ADD-ON: CPT

## 2024-05-30 PROCEDURE — 96374 THER/PROPH/DIAG INJ IV PUSH: CPT

## 2024-05-30 PROCEDURE — 36415 COLL VENOUS BLD VENIPUNCTURE: CPT

## 2024-05-30 PROCEDURE — 99284 EMERGENCY DEPT VISIT MOD MDM: CPT | Performed by: EMERGENCY MEDICINE

## 2024-05-30 PROCEDURE — 96375 TX/PRO/DX INJ NEW DRUG ADDON: CPT

## 2024-05-30 RX ORDER — DICYCLOMINE HCL 20 MG
20 TABLET ORAL ONCE
Status: COMPLETED | OUTPATIENT
Start: 2024-05-30 | End: 2024-05-30

## 2024-05-30 RX ORDER — ONDANSETRON 2 MG/ML
4 INJECTION INTRAMUSCULAR; INTRAVENOUS ONCE
Status: COMPLETED | OUTPATIENT
Start: 2024-05-30 | End: 2024-05-30

## 2024-05-30 RX ORDER — METOCLOPRAMIDE HYDROCHLORIDE 5 MG/ML
10 INJECTION INTRAMUSCULAR; INTRAVENOUS ONCE
Status: COMPLETED | OUTPATIENT
Start: 2024-05-30 | End: 2024-05-30

## 2024-05-30 RX ORDER — KETOROLAC TROMETHAMINE 30 MG/ML
15 INJECTION, SOLUTION INTRAMUSCULAR; INTRAVENOUS ONCE
Status: COMPLETED | OUTPATIENT
Start: 2024-05-30 | End: 2024-05-30

## 2024-05-30 RX ADMIN — DICYCLOMINE HYDROCHLORIDE 20 MG: 20 TABLET ORAL at 03:57

## 2024-05-30 RX ADMIN — METOCLOPRAMIDE HYDROCHLORIDE 10 MG: 5 INJECTION INTRAMUSCULAR; INTRAVENOUS at 04:59

## 2024-05-30 RX ADMIN — SODIUM CHLORIDE 1000 ML: 0.9 INJECTION, SOLUTION INTRAVENOUS at 03:56

## 2024-05-30 RX ADMIN — KETOROLAC TROMETHAMINE 15 MG: 30 INJECTION, SOLUTION INTRAMUSCULAR; INTRAVENOUS at 03:57

## 2024-05-30 RX ADMIN — ONDANSETRON 4 MG: 2 INJECTION INTRAMUSCULAR; INTRAVENOUS at 03:57

## 2024-05-30 NOTE — ED ATTENDING ATTESTATION
5/30/2024  I, Jaguar Wilkerson MD, saw and evaluated the patient. I have discussed the patient with the resident/non-physician practitioner and agree with the resident's/non-physician practitioner's findings, Plan of Care, and MDM as documented in the resident's/non-physician practitioner's note, except where noted. All available labs and Radiology studies were reviewed.  I was present for key portions of any procedure(s) performed by the resident/non-physician practitioner and I was immediately available to provide assistance.       At this point I agree with the current assessment done in the Emergency Department.  I have conducted an independent evaluation of this patient a history and physical is as follows:    ED Course     Patient presents for evaluation due to 1 day of nausea, vomiting, and diarrhea.  Patient does report some generalized abdominal pain.  No antibiotic use, bad food exposure, or recent travel.  No additional complaints.  Will treat with Zofran and will give IV fluids.  Will treat with Bentyl.    Critical Care Time  Procedures

## 2024-05-30 NOTE — Clinical Note
Ken Baker was seen and treated in our emergency department on 5/30/2024.                Diagnosis:     Ken  .    He may return on this date: 05/31/2024    Please excuse patient from work for the dates of May 29 and May 30     If you have any questions or concerns, please don't hesitate to call.      David Griffin MD    ______________________________           _______________          _______________  Hospital Representative                              Date                                Time

## 2024-05-30 NOTE — ED PROVIDER NOTES
History  Chief Complaint   Patient presents with    Abdominal Pain     Pt c/o generalized abdominal pain with N/V/D     24-year-old male with no past medical history presents with nausea, loose stools, and multiple episodes of nonbloody vomiting over the past 20 hours.  Patient also reports generalized abdominal pain that is worse when ambulating.  Denies history of abdominal surgeries, recent travel, sick contacts, or any other symptoms.        Prior to Admission Medications   Prescriptions Last Dose Informant Patient Reported? Taking?   dicyclomine (BENTYL) 20 mg tablet   No No   Sig: Take 1 tablet (20 mg total) by mouth 2 (two) times a day   ondansetron (ZOFRAN) 4 mg tablet   No No   Sig: Take 1 tablet (4 mg total) by mouth every 6 (six) hours      Facility-Administered Medications: None       Past Medical History:   Diagnosis Date    Asthma        Past Surgical History:   Procedure Laterality Date    EAR TUBE REMOVAL      TONSILLECTOMY         History reviewed. No pertinent family history.  I have reviewed and agree with the history as documented.    E-Cigarette/Vaping    E-Cigarette Use Never User      E-Cigarette/Vaping Substances    Nicotine No     THC No     CBD No     Flavoring No     Other No     Unknown No      Social History     Tobacco Use    Smoking status: Never    Smokeless tobacco: Never   Vaping Use    Vaping status: Never Used   Substance Use Topics    Alcohol use: No    Drug use: No        Review of Systems   Constitutional:  Negative for chills and fever.   HENT:  Negative for ear pain and sore throat.    Eyes:  Negative for pain and visual disturbance.   Respiratory:  Negative for cough and shortness of breath.    Cardiovascular:  Negative for chest pain and palpitations.   Gastrointestinal:  Positive for abdominal pain, diarrhea, nausea and vomiting.   Genitourinary:  Negative for dysuria and hematuria.   Musculoskeletal:  Negative for arthralgias and back pain.   Skin:  Negative for color  change and rash.   Neurological:  Negative for seizures and syncope.   All other systems reviewed and are negative.      Physical Exam  ED Triage Vitals [05/30/24 0337]   Temperature Pulse Respirations Blood Pressure SpO2   98.3 °F (36.8 °C) 71 16 125/58 97 %      Temp Source Heart Rate Source Patient Position - Orthostatic VS BP Location FiO2 (%)   Oral Monitor Sitting Right arm --      Pain Score       7             Orthostatic Vital Signs  Vitals:    05/30/24 0337   BP: 125/58   Pulse: 71   Patient Position - Orthostatic VS: Sitting       Physical Exam  Vitals and nursing note reviewed.   Constitutional:       General: He is not in acute distress.     Appearance: He is well-developed and normal weight. He is ill-appearing. He is not toxic-appearing or diaphoretic.   HENT:      Head: Normocephalic and atraumatic.      Comments: Dry lips  Eyes:      Conjunctiva/sclera: Conjunctivae normal.   Cardiovascular:      Rate and Rhythm: Normal rate and regular rhythm.      Heart sounds: No murmur heard.  Pulmonary:      Effort: Pulmonary effort is normal. No respiratory distress.      Breath sounds: Normal breath sounds.   Abdominal:      General: Abdomen is flat. There is no distension.      Palpations: Abdomen is soft.      Tenderness: There is no abdominal tenderness. There is no right CVA tenderness, left CVA tenderness, guarding or rebound. Negative signs include Albarado's sign.      Hernia: No hernia is present.   Musculoskeletal:         General: No swelling.      Cervical back: Neck supple.   Skin:     General: Skin is warm and dry.      Capillary Refill: Capillary refill takes less than 2 seconds.   Neurological:      Mental Status: He is alert and oriented to person, place, and time.   Psychiatric:         Mood and Affect: Mood normal.         Behavior: Behavior normal.         ED Medications  Medications   ondansetron (ZOFRAN) injection 4 mg (4 mg Intravenous Given 5/30/24 1769)   ketorolac (TORADOL) injection 15  mg (15 mg Intravenous Given 5/30/24 0357)   dicyclomine (BENTYL) tablet 20 mg (20 mg Oral Given 5/30/24 0357)   sodium chloride 0.9 % bolus 1,000 mL (0 mL Intravenous Stopped 5/30/24 3232)   metoclopramide (REGLAN) injection 10 mg (10 mg Intravenous Given 5/30/24 4889)       Diagnostic Studies  Results Reviewed       Procedure Component Value Units Date/Time    Comprehensive metabolic panel [[id number]] Collected: 05/30/24 0354    Lab Status: Final result Specimen: Blood from Arm, Left Updated: 05/30/24 0440     Sodium 139 mmol/L      Potassium 3.5 mmol/L      Chloride 104 mmol/L      CO2 24 mmol/L      ANION GAP 11 mmol/L      BUN 25 mg/dL      Creatinine 0.77 mg/dL      Glucose 97 mg/dL      Calcium 9.5 mg/dL      AST 18 U/L      ALT 12 U/L      Alkaline Phosphatase 78 U/L      Total Protein 7.0 g/dL      Albumin 4.8 g/dL      Total Bilirubin 0.86 mg/dL      eGFR 125 ml/min/1.73sq m     Narrative:      National Kidney Disease Foundation guidelines for Chronic Kidney Disease (CKD):     Stage 1 with normal or high GFR (GFR > 90 mL/min/1.73 square meters)    Stage 2 Mild CKD (GFR = 60-89 mL/min/1.73 square meters)    Stage 3A Moderate CKD (GFR = 45-59 mL/min/1.73 square meters)    Stage 3B Moderate CKD (GFR = 30-44 mL/min/1.73 square meters)    Stage 4 Severe CKD (GFR = 15-29 mL/min/1.73 square meters)    Stage 5 End Stage CKD (GFR <15 mL/min/1.73 square meters)  Note: GFR calculation is accurate only with a steady state creatinine    Lipase [[id number]]  (Abnormal) Collected: 05/30/24 0354    Lab Status: Final result Specimen: Blood from Arm, Left Updated: 05/30/24 0440     Lipase 8 u/L     CBC and differential [[id number]]  (Abnormal) Collected: 05/30/24 0354    Lab Status: Final result Specimen: Blood from Arm, Left Updated: 05/30/24 0417     WBC 14.28 Thousand/uL      RBC 4.62 Million/uL      Hemoglobin 14.5 g/dL      Hematocrit 42.5 %      MCV 92 fL      MCH 31.4 pg      MCHC 34.1 g/dL      RDW 12.1 %      MPV 9.9  fL      Platelets 285 Thousands/uL      nRBC 0 /100 WBCs      Segmented % 88 %      Immature Grans % 0 %      Lymphocytes % 8 %      Monocytes % 4 %      Eosinophils Relative 0 %      Basophils Relative 0 %      Absolute Neutrophils 12.44 Thousands/µL      Absolute Immature Grans 0.06 Thousand/uL      Absolute Lymphocytes 1.17 Thousands/µL      Absolute Monocytes 0.56 Thousand/µL      Eosinophils Absolute 0.01 Thousand/µL      Basophils Absolute 0.04 Thousands/µL                    No orders to display         Procedures  Procedures      ED Course                                       Medical Decision Making  This patient presents with  nausea, vomiting & diarrhea. Differential diagnoses includes possible acute gastroenteritis. Abdominal exam without peritoneal signs. Currently euvolemic without evidence of dehydration. No evidence of surgical abdomen or other acute medical emergency including bowel obstruction, viscus perforation, vascular catastrophe, atypical appendicitis, acute cholecystitis at this time. Presentation not consistent with other acute, emergent causes of vomiting / diarrhea at this time. No indication for abdominal imaging.    Plan: CMP to rule out acute hepatitis, lipase to rule out acute pancreatitis, supportive care, oral // IV rehydration, serial abdominal exam, reassess     Amount and/or Complexity of Data Reviewed  Labs: ordered.    Risk  Prescription drug management.          Disposition  Final diagnoses:   Generalized abdominal pain   Nausea vomiting and diarrhea     Time reflects when diagnosis was documented in both MDM as applicable and the Disposition within this note       Time User Action Codes Description Comment    5/30/2024  4:03 AM David Griffin Add [R10.84] Generalized abdominal pain     5/30/2024  4:03 AM David Griffin Add [R11.2,  R19.7] Nausea vomiting and diarrhea     5/30/2024  4:03 AM David Griffin Modify [R10.84] Generalized abdominal pain     5/30/2024  4:03 AM David Griffin  Modify [R11.2,  R19.7] Nausea vomiting and diarrhea           ED Disposition       ED Disposition   Discharge    Condition   Stable    Date/Time   Thu May 30, 2024  4:41 AM    Comment   Ken Baker discharge to home/self care.                   Follow-up Information       Follow up With Specialties Details Why Contact Info    Lindsay Daniels MD Family Medicine   [address] [place] [address]   [id number]  [facility] [address]  [phone]              Discharge Medication List as of 5/30/2024  5:16 AM        CONTINUE these medications which have NOT CHANGED    Details   dicyclomine (BENTYL) 20 mg tablet Take 1 tablet (20 mg total) by mouth 2 (two) times a day, Starting Fri 3/22/2024, Normal      ondansetron (ZOFRAN) 4 mg tablet Take 1 tablet (4 mg total) by mouth every 6 (six) hours, Starting Fri 3/22/2024, Normal           No discharge procedures on file.    PDMP Review       None             ED Provider  Attending physically available and evaluated Ken Baker. I managed the patient along with the ED Attending.    Electronically Signed by           David Griffin MD  05/30/24 3065

## 2024-06-24 ENCOUNTER — HOSPITAL ENCOUNTER (EMERGENCY)
Facility: HOSPITAL | Age: 24
Discharge: HOME | End: 2024-06-24
Attending: EMERGENCY MEDICINE

## 2024-06-24 VITALS
DIASTOLIC BLOOD PRESSURE: 71 MMHG | RESPIRATION RATE: 18 BRPM | TEMPERATURE: 98 F | SYSTOLIC BLOOD PRESSURE: 126 MMHG | HEART RATE: 68 BPM | OXYGEN SATURATION: 98 %

## 2024-06-24 DIAGNOSIS — R11.2 NAUSEA AND VOMITING: Primary | ICD-10-CM

## 2024-06-24 LAB
ALBUMIN SERPL BCG-MCNC: 4 G/DL (ref 3.5–5)
ALP SERPL-CCNC: 65 U/L (ref 34–104)
ALT SERPL W P-5'-P-CCNC: 12 U/L (ref 7–52)
ANION GAP SERPL CALCULATED.3IONS-SCNC: 6 MMOL/L (ref 4–13)
AST SERPL W P-5'-P-CCNC: 16 U/L (ref 13–39)
BASOPHILS # BLD AUTO: 0.06 THOUSANDS/ÂΜL (ref 0–0.1)
BASOPHILS NFR BLD AUTO: 1 % (ref 0–1)
BILIRUB SERPL-MCNC: 0.34 MG/DL (ref 0.2–1)
BUN SERPL-MCNC: 19 MG/DL (ref 5–25)
CALCIUM SERPL-MCNC: 8.9 MG/DL (ref 8.4–10.2)
CHLORIDE SERPL-SCNC: 106 MMOL/L (ref 96–108)
CO2 SERPL-SCNC: 27 MMOL/L (ref 21–32)
CREAT SERPL-MCNC: 0.8 MG/DL (ref 0.6–1.3)
EOSINOPHIL # BLD AUTO: 0.21 THOUSAND/ÂΜL (ref 0–0.61)
EOSINOPHIL NFR BLD AUTO: 2 % (ref 0–6)
ERYTHROCYTE [DISTWIDTH] IN BLOOD BY AUTOMATED COUNT: 12 % (ref 11.6–15.1)
GFR SERPL CREATININE-BSD FRML MDRD: 123 ML/MIN/1.73SQ M
GLUCOSE SERPL-MCNC: 102 MG/DL (ref 65–140)
HCT VFR BLD AUTO: 38.2 % (ref 36.5–49.3)
HGB BLD-MCNC: 13.2 G/DL (ref 12–17)
IMM GRANULOCYTES # BLD AUTO: 0.05 THOUSAND/UL (ref 0–0.2)
IMM GRANULOCYTES NFR BLD AUTO: 0 % (ref 0–2)
LYMPHOCYTES # BLD AUTO: 3.43 THOUSANDS/ÂΜL (ref 0.6–4.47)
LYMPHOCYTES NFR BLD AUTO: 30 % (ref 14–44)
MCH RBC QN AUTO: 31.7 PG (ref 26.8–34.3)
MCHC RBC AUTO-ENTMCNC: 34.6 G/DL (ref 31.4–37.4)
MCV RBC AUTO: 92 FL (ref 82–98)
MONOCYTES # BLD AUTO: 0.85 THOUSAND/ÂΜL (ref 0.17–1.22)
MONOCYTES NFR BLD AUTO: 7 % (ref 4–12)
NEUTROPHILS # BLD AUTO: 6.84 THOUSANDS/ÂΜL (ref 1.85–7.62)
NEUTS SEG NFR BLD AUTO: 60 % (ref 43–75)
NRBC BLD AUTO-RTO: 0 /100 WBCS
PLATELET # BLD AUTO: 264 THOUSANDS/UL (ref 149–390)
PMV BLD AUTO: 9.6 FL (ref 8.9–12.7)
POTASSIUM SERPL-SCNC: 3.5 MMOL/L (ref 3.5–5.3)
PROT SERPL-MCNC: 6.2 G/DL (ref 6.4–8.4)
RBC # BLD AUTO: 4.17 MILLION/UL (ref 3.88–5.62)
SODIUM SERPL-SCNC: 139 MMOL/L (ref 135–147)
WBC # BLD AUTO: 11.44 THOUSAND/UL (ref 4.31–10.16)

## 2024-06-24 PROCEDURE — 96361 HYDRATE IV INFUSION ADD-ON: CPT

## 2024-06-24 PROCEDURE — 96375 TX/PRO/DX INJ NEW DRUG ADDON: CPT

## 2024-06-24 PROCEDURE — 80053 COMPREHEN METABOLIC PANEL: CPT

## 2024-06-24 PROCEDURE — 36415 COLL VENOUS BLD VENIPUNCTURE: CPT

## 2024-06-24 PROCEDURE — 96374 THER/PROPH/DIAG INJ IV PUSH: CPT

## 2024-06-24 PROCEDURE — 99284 EMERGENCY DEPT VISIT MOD MDM: CPT | Performed by: EMERGENCY MEDICINE

## 2024-06-24 PROCEDURE — 99283 EMERGENCY DEPT VISIT LOW MDM: CPT

## 2024-06-24 PROCEDURE — 85025 COMPLETE CBC W/AUTO DIFF WBC: CPT

## 2024-06-24 RX ORDER — ONDANSETRON 4 MG/1
4 TABLET, FILM COATED ORAL EVERY 6 HOURS
Qty: 12 TABLET | Refills: 0 | Status: SHIPPED | OUTPATIENT
Start: 2024-06-24

## 2024-06-24 RX ORDER — METOCLOPRAMIDE HYDROCHLORIDE 5 MG/ML
10 INJECTION INTRAMUSCULAR; INTRAVENOUS ONCE
Status: COMPLETED | OUTPATIENT
Start: 2024-06-24 | End: 2024-06-24

## 2024-06-24 RX ORDER — ONDANSETRON 2 MG/ML
4 INJECTION INTRAMUSCULAR; INTRAVENOUS ONCE
Status: COMPLETED | OUTPATIENT
Start: 2024-06-24 | End: 2024-06-24

## 2024-06-24 RX ORDER — KETOROLAC TROMETHAMINE 30 MG/ML
15 INJECTION, SOLUTION INTRAMUSCULAR; INTRAVENOUS ONCE
Status: COMPLETED | OUTPATIENT
Start: 2024-06-24 | End: 2024-06-24

## 2024-06-24 RX ADMIN — ONDANSETRON 4 MG: 2 INJECTION INTRAMUSCULAR; INTRAVENOUS at 07:55

## 2024-06-24 RX ADMIN — SODIUM CHLORIDE 1000 ML: 0.9 INJECTION, SOLUTION INTRAVENOUS at 09:50

## 2024-06-24 RX ADMIN — METOCLOPRAMIDE 10 MG: 5 INJECTION, SOLUTION INTRAMUSCULAR; INTRAVENOUS at 09:50

## 2024-06-24 RX ADMIN — KETOROLAC TROMETHAMINE 15 MG: 30 INJECTION, SOLUTION INTRAMUSCULAR at 07:53

## 2024-06-24 RX ADMIN — SODIUM CHLORIDE 1000 ML: 0.9 INJECTION, SOLUTION INTRAVENOUS at 07:43

## 2024-06-24 NOTE — ED ATTENDING ATTESTATION
6/24/2024  I, Ketty Barnett MD, saw and evaluated the patient. I have discussed the patient with the resident/non-physician practitioner and agree with the resident's/non-physician practitioner's findings, Plan of Care, and MDM as documented in the resident's/non-physician practitioner's note, except where noted. All available labs and Radiology studies were reviewed.  I was present for key portions of any procedure(s) performed by the resident/non-physician practitioner and I was immediately available to provide assistance.       At this point I agree with the current assessment done in the Emergency Department.  I have conducted an independent evaluation of this patient a history and physical is as follows:    This is evaluation of a 24-year-old male who presents to the emergency department with nausea vomiting and diarrhea x 1 day.  Patient states that yesterday he went to the beach and after swimming in the [place]  He subsequently developed nausea with vomiting.  States that he is currently unable to tolerate p.o. secondary to symptoms.  Also has loose nonbloody stools.  Associated abdominal cramping without overt pain.  No urinary symptoms.  No fevers no chills lightheadedness or dizziness.  No chest pain no shortness of breath.  No known sick contacts.  People that he was with were also not affected.  On exam patient is nontoxic resting comfortably.  Vital signs are currently stable.  HEENT is normocephalic and atraumatic with moist mucous membranes clear sclera and conjunctive are that are anicteric.  Neck supple full range of motion.  Heart is regular rate.  Lungs are clear no wheezing rhonchi or rales.  Abdomen soft positive bowel sounds nontender to palpation.  No rebound no guarding no masses.  No edema.  No calf tenderness palpation.  Intact distal pulses capillary fill less than 2 seconds.  Awake alert oriented and appropriate.  Assessment and plan  Pramipexole .125 mg.  was ordered by Neurology and Patient want to know if any contraindications with any of her other medication. Also refills of other medication needed sent to you   "24-year-old male with nausea vomiting and diarrhea x 1 day.  Abdominal cramping without overt pain.  Will check basic blood work of IV fluid hydration.  Symptomatic control.  Reevaluate.  Patient feeling improved tolerating p.o. will likely DC to follow-up with PCP otherwise consideration of additional evaluation.    Portions of the record may have been created with voice recognition software.  Occasional wrong word or “sound-a-like\" substitutions may have occurred due to the inherent limitations of voice recognition software.  Review chart carefully and recognize, using context, where substitutions have occurred.     ED Course     Patient feels significantly improved.  No further episodes of nausea vomiting.  Cramping resolved.  No episodes of diarrhea.  No abdominal pain.  Will p.o. challenge.    Critical Care Time  Procedures      "

## 2024-06-24 NOTE — Clinical Note
Ken Baker was seen and treated in our emergency department on 6/24/2024.    No restrictions            Diagnosis:     Ken  may return to work on return date.    He may return on this date: 06/25/2024         If you have any questions or concerns, please don't hesitate to call.      Nu Simpson RN    ______________________________           _______________          _______________  Hospital Representative                              Date                                Time

## 2024-06-29 NOTE — ED PROVIDER NOTES
History  Chief Complaint   Patient presents with    Vomiting     Patient states he has been vomiting since yesterday.      HPI  Patient is 24-year-old male presenting for nausea vomiting x 1 day.  Patient states he went to the beach yesterday and [place], subsequently developed nausea and vomiting.  (Nonbloody).  Patient states currently unable to tolerate p.o. secondary to symptoms but denies any loose stools, no diarrhea denies any fever chills lightheadedness dizziness.  No known sick contacts.  Prior to Admission Medications   Prescriptions Last Dose Informant Patient Reported? Taking?   dicyclomine (BENTYL) 20 mg tablet   No No   Sig: Take 1 tablet (20 mg total) by mouth 2 (two) times a day   ondansetron (ZOFRAN) 4 mg tablet   No No   Sig: Take 1 tablet (4 mg total) by mouth every 6 (six) hours      Facility-Administered Medications: None       Past Medical History:   Diagnosis Date    Asthma        Past Surgical History:   Procedure Laterality Date    EAR TUBE REMOVAL      TONSILLECTOMY         No family history on file.  I have reviewed and agree with the history as documented.    E-Cigarette/Vaping    E-Cigarette Use Never User      E-Cigarette/Vaping Substances    Nicotine No     THC No     CBD No     Flavoring No     Other No     Unknown No      Social History     Tobacco Use    Smoking status: Never    Smokeless tobacco: Never   Vaping Use    Vaping status: Never Used   Substance Use Topics    Alcohol use: No    Drug use: No        Review of Systems   Constitutional: Negative.    HENT: Negative.     Eyes: Negative.    Respiratory: Negative.     Cardiovascular: Negative.    Gastrointestinal:  Positive for nausea and vomiting. Negative for diarrhea.   Endocrine: Negative.    Genitourinary: Negative.    Musculoskeletal: Negative.    Allergic/Immunologic: Negative.    Neurological: Negative.    Hematological: Negative.    Psychiatric/Behavioral: Negative.         Physical Exam  ED  Triage Vitals   Temperature Pulse Respirations Blood Pressure SpO2   06/24/24 0716 06/24/24 0716 06/24/24 0716 06/24/24 0716 06/24/24 0716   98 °F (36.7 °C) 65 18 126/71 96 %      Temp Source Heart Rate Source Patient Position - Orthostatic VS BP Location FiO2 (%)   06/24/24 0716 06/24/24 0716 06/24/24 0716 06/24/24 0716 --   Temporal Monitor Sitting Left arm       Pain Score       06/24/24 0753       7             Orthostatic Vital Signs  Vitals:    06/24/24 0900 06/24/24 1000 06/24/24 1100 06/24/24 1200   BP:       Pulse: 62 60 68 68   Patient Position - Orthostatic VS:           Physical Exam  Vitals and nursing note reviewed.   Constitutional:       Appearance: Normal appearance. He is normal weight.   HENT:      Head: Normocephalic and atraumatic.      Right Ear: Tympanic membrane, ear canal and external ear normal.      Left Ear: Tympanic membrane, ear canal and external ear normal.      Nose: Nose normal.      Mouth/Throat:      Mouth: Mucous membranes are moist.      Pharynx: Oropharynx is clear.   Eyes:      Extraocular Movements: Extraocular movements intact.      Conjunctiva/sclera: Conjunctivae normal.      Pupils: Pupils are equal, round, and reactive to light.   Cardiovascular:      Rate and Rhythm: Normal rate and regular rhythm.      Pulses: Normal pulses.      Heart sounds: Normal heart sounds.   Pulmonary:      Effort: Pulmonary effort is normal.      Breath sounds: Normal breath sounds.   Abdominal:      General: Abdomen is flat. Bowel sounds are normal.      Palpations: Abdomen is soft.      Tenderness: There is no abdominal tenderness. There is no guarding or rebound.   Musculoskeletal:         General: Normal range of motion.      Cervical back: Normal range of motion and neck supple.   Skin:     General: Skin is warm and dry.      Capillary Refill: Capillary refill takes less than 2 seconds.   Neurological:      General: No focal deficit present.      Mental Status: He is alert and oriented to  person, place, and time.   Psychiatric:         Mood and Affect: Mood normal.         Behavior: Behavior normal.         Thought Content: Thought content normal.         Judgment: Judgment normal.         ED Medications  Medications   sodium chloride 0.9 % bolus 1,000 mL (0 mL Intravenous Stopped 6/24/24 0843)   ondansetron (ZOFRAN) injection 4 mg (4 mg Intravenous Given 6/24/24 0755)   ketorolac (TORADOL) injection 15 mg (15 mg Intravenous Given 6/24/24 0753)   sodium chloride 0.9 % bolus 1,000 mL (0 mL Intravenous Stopped 6/24/24 1050)   metoclopramide (REGLAN) injection 10 mg (10 mg Intravenous Given 6/24/24 0950)       Diagnostic Studies  Results Reviewed       Procedure Component Value Units Date/Time    Comprehensive metabolic panel [[id number]]  (Abnormal) Collected: 06/24/24 0742    Lab Status: Final result Specimen: Blood from Arm, Left Updated: 06/24/24 0815     Sodium 139 mmol/L      Potassium 3.5 mmol/L      Chloride 106 mmol/L      CO2 27 mmol/L      ANION GAP 6 mmol/L      BUN 19 mg/dL      Creatinine 0.80 mg/dL      Glucose 102 mg/dL      Calcium 8.9 mg/dL      AST 16 U/L      ALT 12 U/L      Alkaline Phosphatase 65 U/L      Total Protein 6.2 g/dL      Albumin 4.0 g/dL      Total Bilirubin 0.34 mg/dL      eGFR 123 ml/min/1.73sq m     Narrative:      National Kidney Disease Foundation guidelines for Chronic Kidney Disease (CKD):     Stage 1 with normal or high GFR (GFR > 90 mL/min/1.73 square meters)    Stage 2 Mild CKD (GFR = 60-89 mL/min/1.73 square meters)    Stage 3A Moderate CKD (GFR = 45-59 mL/min/1.73 square meters)    Stage 3B Moderate CKD (GFR = 30-44 mL/min/1.73 square meters)    Stage 4 Severe CKD (GFR = 15-29 mL/min/1.73 square meters)    Stage 5 End Stage CKD (GFR <15 mL/min/1.73 square meters)  Note: GFR calculation is accurate only with a steady state creatinine    CBC and differential [[id number]]  (Abnormal) Collected: 06/24/24 0742    Lab Status: Final result Specimen: Blood from Arm,  Left Updated: 06/24/24 0751     WBC 11.44 Thousand/uL      RBC 4.17 Million/uL      Hemoglobin 13.2 g/dL      Hematocrit 38.2 %      MCV 92 fL      MCH 31.7 pg      MCHC 34.6 g/dL      RDW 12.0 %      MPV 9.6 fL      Platelets 264 Thousands/uL      nRBC 0 /100 WBCs      Segmented % 60 %      Immature Grans % 0 %      Lymphocytes % 30 %      Monocytes % 7 %      Eosinophils Relative 2 %      Basophils Relative 1 %      Absolute Neutrophils 6.84 Thousands/µL      Absolute Immature Grans 0.05 Thousand/uL      Absolute Lymphocytes 3.43 Thousands/µL      Absolute Monocytes 0.85 Thousand/µL      Eosinophils Absolute 0.21 Thousand/µL      Basophils Absolute 0.06 Thousands/µL                    No orders to display         Procedures  Procedures      ED Course                             SBIRT 20yo+      Flowsheet Row Most Recent Value   REAL: How many times in the past year have you...    Used an illegal drug or used a prescription medication for non-medical reasons? Never Filed at: 06/24/2024 0718                  Medical Decision Making  Patient is 24-year-old male presenting for nausea and vomiting x 1 day.  DDx: Food poisoning, gastroenteritis, gastritis  Based on patient rotation physical exam finds, primary concern is for possible food poisoning.  Plan treat symptomatically with Toradol Zofran, Reglan fluids, p.o. challenge.  Basic lab workup.  Labs negative for any acute findings.  Patient states symptoms improved following Reglan.  Plan for discharge with prescription for home for Zofran.  Return precautions given.  Patient understands agrees.  Ready for discharge.    Problems Addressed:  Nausea and vomiting: acute illness or injury    Amount and/or Complexity of Data Reviewed  Labs: ordered.    Risk  Prescription drug management.          Disposition  Final diagnoses:   Nausea and vomiting     Time reflects when diagnosis was documented in both MDM as applicable and the Disposition within this note       Time User  Action Codes Description Comment    6/24/2024 11:51 AM Darien Mendoza Add [R11.2] Nausea and vomiting           ED Disposition       ED Disposition   Discharge    Condition   Stable    Date/Time   Mon Jun 24, 2024 1151    Comment   Ken Baker discharge to home/self care.                   Follow-up Information       Follow up With Specialties Details Why Contact Info Additional Information    [facility] Emergency Department Emergency Medicine Go to  If symptoms worsen [address] [facility] [address]  [phone] [facility] Emergency Department, [address] [place], [place], [address]   [phone]    Lindsay Daniels MD Family Medicine Go to  If symptoms worsen [address] [facility]   [id number]  [facility] [address]  [phone]               Discharge Medication List as of 6/24/2024 12:16 PM        START taking these medications    Details   !! ondansetron (ZOFRAN) 4 mg tablet Take 1 tablet (4 mg total) by mouth every 6 (six) hours, Starting Mon 6/24/2024, Normal       !! - Potential duplicate medications found. Please discuss with provider.        CONTINUE these medications which have NOT CHANGED    Details   dicyclomine (BENTYL) 20 mg tablet Take 1 tablet (20 mg total) by mouth 2 (two) times a day, Starting Fri 3/22/2024, Normal      !! ondansetron (ZOFRAN) 4 mg tablet Take 1 tablet (4 mg total) by mouth every 6 (six) hours, Starting Fri 3/22/2024, Normal       !! - Potential duplicate medications found. Please discuss with provider.        No discharge procedures on file.    PDMP Review       None             ED Provider  Attending physically available and evaluated Ken Baker. I managed the patient along with the ED Attending.    Electronically Signed by           Darien Mendoza MD  06/29/24 3367

## 2024-12-06 ENCOUNTER — APPOINTMENT (EMERGENCY)
Dept: RADIOLOGY | Facility: HOSPITAL | Age: 24
End: 2024-12-06
Payer: COMMERCIAL

## 2024-12-06 ENCOUNTER — HOSPITAL ENCOUNTER (EMERGENCY)
Facility: HOSPITAL | Age: 24
Discharge: HOME | End: 2024-12-06
Attending: EMERGENCY MEDICINE
Payer: COMMERCIAL

## 2024-12-06 VITALS
WEIGHT: 190 LBS | TEMPERATURE: 97.9 F | RESPIRATION RATE: 16 BRPM | DIASTOLIC BLOOD PRESSURE: 60 MMHG | HEART RATE: 69 BPM | BODY MASS INDEX: 26.5 KG/M2 | OXYGEN SATURATION: 99 % | SYSTOLIC BLOOD PRESSURE: 131 MMHG

## 2024-12-06 DIAGNOSIS — R26.2 AMBULATORY DYSFUNCTION: ICD-10-CM
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
## 2024-12-06 DIAGNOSIS — S22.000A COMPRESSION FRACTURE OF BODY OF THORACIC VERTEBRA (HCC): Primary | ICD-10-CM

## 2024-12-06 DIAGNOSIS — M54.9 BACK PAIN: ICD-10-CM
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
## 2024-12-06 LAB
ANION GAP SERPL CALCULATED.3IONS-SCNC: 5 MMOL/L (ref 4–13)
BASOPHILS # BLD AUTO: 0.05 THOUSANDS/ÂΜL (ref 0–0.1)
BASOPHILS NFR BLD AUTO: 1 % (ref 0–1)
BUN SERPL-MCNC: 21 MG/DL (ref 5–25)
CALCIUM SERPL-MCNC: 9.1 MG/DL (ref 8.4–10.2)
CHLORIDE SERPL-SCNC: 104 MMOL/L (ref 96–108)
CO2 SERPL-SCNC: 29 MMOL/L (ref 21–32)
CREAT SERPL-MCNC: 0.7 MG/DL (ref 0.6–1.3)
EOSINOPHIL # BLD AUTO: 0.11 THOUSAND/ÂΜL (ref 0–0.61)
EOSINOPHIL NFR BLD AUTO: 2 % (ref 0–6)
ERYTHROCYTE [DISTWIDTH] IN BLOOD BY AUTOMATED COUNT: 11.9 % (ref 11.6–15.1)
GFR SERPL CREATININE-BSD FRML MDRD: 130 ML/MIN/1.73SQ M
GLUCOSE SERPL-MCNC: 101 MG/DL (ref 65–140)
HCT VFR BLD AUTO: 42.4 % (ref 36.5–49.3)
HGB BLD-MCNC: 14.6 G/DL (ref 12–17)
IMM GRANULOCYTES # BLD AUTO: 0.01 THOUSAND/UL (ref 0–0.2)
IMM GRANULOCYTES NFR BLD AUTO: 0 % (ref 0–2)
LYMPHOCYTES # BLD AUTO: 1.76 THOUSANDS/ÂΜL (ref 0.6–4.47)
LYMPHOCYTES NFR BLD AUTO: 29 % (ref 14–44)
MCH RBC QN AUTO: 31.8 PG (ref 26.8–34.3)
MCHC RBC AUTO-ENTMCNC: 34.4 G/DL (ref 31.4–37.4)
MCV RBC AUTO: 92 FL (ref 82–98)
MONOCYTES # BLD AUTO: 0.58 THOUSAND/ÂΜL (ref 0.17–1.22)
MONOCYTES NFR BLD AUTO: 9 % (ref 4–12)
NEUTROPHILS # BLD AUTO: 3.65 THOUSANDS/ÂΜL (ref 1.85–7.62)
NEUTS SEG NFR BLD AUTO: 59 % (ref 43–75)
NRBC BLD AUTO-RTO: 0 /100 WBCS
PLATELET # BLD AUTO: 271 THOUSANDS/UL (ref 149–390)
PMV BLD AUTO: 9.7 FL (ref 8.9–12.7)
POTASSIUM SERPL-SCNC: 3.8 MMOL/L (ref 3.5–5.3)
RBC # BLD AUTO: 4.59 MILLION/UL (ref 3.88–5.62)
SODIUM SERPL-SCNC: 138 MMOL/L (ref 135–147)
WBC # BLD AUTO: 6.16 THOUSAND/UL (ref 4.31–10.16)

## 2024-12-06 PROCEDURE — 72080 X-RAY EXAM THORACOLMB 2/> VW: CPT

## 2024-12-06 PROCEDURE — 85025 COMPLETE CBC W/AUTO DIFF WBC: CPT

## 2024-12-06 PROCEDURE — 74176 CT ABD & PELVIS W/O CONTRAST: CPT

## 2024-12-06 PROCEDURE — 36415 COLL VENOUS BLD VENIPUNCTURE: CPT

## 2024-12-06 PROCEDURE — 71250 CT THORAX DX C-: CPT

## 2024-12-06 PROCEDURE — 96375 TX/PRO/DX INJ NEW DRUG ADDON: CPT

## 2024-12-06 PROCEDURE — 99284 EMERGENCY DEPT VISIT MOD MDM: CPT

## 2024-12-06 PROCEDURE — 96374 THER/PROPH/DIAG INJ IV PUSH: CPT

## 2024-12-06 PROCEDURE — 80048 BASIC METABOLIC PNL TOTAL CA: CPT

## 2024-12-06 PROCEDURE — 99285 EMERGENCY DEPT VISIT HI MDM: CPT | Performed by: EMERGENCY MEDICINE

## 2024-12-06 RX ORDER — LIDOCAINE 50 MG/G
1 PATCH TOPICAL DAILY
Qty: 30 PATCH | Refills: 0 | Status: SHIPPED | OUTPATIENT
Start: 2024-12-06

## 2024-12-06 RX ORDER — KETOROLAC TROMETHAMINE 30 MG/ML
15 INJECTION, SOLUTION INTRAMUSCULAR; INTRAVENOUS ONCE
Status: COMPLETED | OUTPATIENT
Start: 2024-12-06 | End: 2024-12-06

## 2024-12-06 RX ORDER — OXYCODONE HYDROCHLORIDE 5 MG/1
5 TABLET ORAL ONCE
Refills: 0 | Status: COMPLETED | OUTPATIENT
Start: 2024-12-06 | End: 2024-12-06

## 2024-12-06 RX ORDER — MORPHINE SULFATE 4 MG/ML
4 INJECTION, SOLUTION INTRAMUSCULAR; INTRAVENOUS ONCE
Status: COMPLETED | OUTPATIENT
Start: 2024-12-06 | End: 2024-12-06

## 2024-12-06 RX ORDER — SENNOSIDES 8.6 MG
650 CAPSULE ORAL EVERY 8 HOURS PRN
Qty: 60 TABLET | Refills: 0 | Status: SHIPPED | OUTPATIENT
Start: 2024-12-06

## 2024-12-06 RX ORDER — LIDOCAINE 50 MG/G
1 PATCH TOPICAL ONCE
Status: DISCONTINUED | OUTPATIENT
Start: 2024-12-06 | End: 2024-12-06

## 2024-12-06 RX ORDER — DOCUSATE SODIUM 100 MG/1
100 CAPSULE, LIQUID FILLED ORAL ONCE
Status: COMPLETED | OUTPATIENT
Start: 2024-12-06 | End: 2024-12-06

## 2024-12-06 RX ORDER — GABAPENTIN 300 MG/1
300 CAPSULE ORAL 3 TIMES DAILY
Qty: 90 CAPSULE | Refills: 0 | Status: SHIPPED | OUTPATIENT
Start: 2024-12-06 | End: 2025-01-05

## 2024-12-06 RX ORDER — ACETAMINOPHEN 325 MG/1
975 TABLET ORAL EVERY 8 HOURS SCHEDULED
Status: DISCONTINUED | OUTPATIENT
Start: 2024-12-06 | End: 2024-12-06

## 2024-12-06 RX ORDER — IBUPROFEN 400 MG/1
400 TABLET, FILM COATED ORAL EVERY 6 HOURS PRN
Qty: 60 TABLET | Refills: 0 | Status: SHIPPED | OUTPATIENT
Start: 2024-12-06

## 2024-12-06 RX ORDER — METHOCARBAMOL 750 MG/1
750 TABLET, FILM COATED ORAL ONCE
Status: COMPLETED | OUTPATIENT
Start: 2024-12-06 | End: 2024-12-06

## 2024-12-06 RX ORDER — OXYCODONE HYDROCHLORIDE 5 MG/1
5 TABLET ORAL EVERY 4 HOURS PRN
Refills: 0 | Status: DISCONTINUED | OUTPATIENT
Start: 2024-12-06 | End: 2024-12-06

## 2024-12-06 RX ORDER — ACETAMINOPHEN 325 MG/1
650 TABLET ORAL ONCE
Status: COMPLETED | OUTPATIENT
Start: 2024-12-06 | End: 2024-12-06

## 2024-12-06 RX ORDER — GABAPENTIN 100 MG/1
100 CAPSULE ORAL 3 TIMES DAILY
Status: DISCONTINUED | OUTPATIENT
Start: 2024-12-06 | End: 2024-12-06

## 2024-12-06 RX ORDER — METHOCARBAMOL 500 MG/1
500 TABLET, FILM COATED ORAL 2 TIMES DAILY
Qty: 20 TABLET | Refills: 0 | Status: SHIPPED | OUTPATIENT
Start: 2024-12-06

## 2024-12-06 RX ORDER — ACETAMINOPHEN 325 MG/1
650 TABLET ORAL EVERY 4 HOURS PRN
Status: DISCONTINUED | OUTPATIENT
Start: 2024-12-06 | End: 2024-12-06

## 2024-12-06 RX ADMIN — GABAPENTIN 100 MG: 100 CAPSULE ORAL at 17:29

## 2024-12-06 RX ADMIN — MORPHINE SULFATE 4 MG: 4 INJECTION INTRAVENOUS at 11:50

## 2024-12-06 RX ADMIN — OXYCODONE HYDROCHLORIDE 5 MG: 5 TABLET ORAL at 19:24

## 2024-12-06 RX ADMIN — DOCUSATE SODIUM 100 MG: 100 CAPSULE, LIQUID FILLED ORAL at 15:45

## 2024-12-06 RX ADMIN — KETOROLAC TROMETHAMINE 15 MG: 30 INJECTION, SOLUTION INTRAMUSCULAR; INTRAVENOUS at 15:44

## 2024-12-06 RX ADMIN — ACETAMINOPHEN 650 MG: 325 TABLET, FILM COATED ORAL at 15:45

## 2024-12-06 RX ADMIN — METHOCARBAMOL 750 MG: 750 TABLET ORAL at 19:24

## 2024-12-06 RX ADMIN — METHOCARBAMOL 750 MG: 750 TABLET ORAL at 15:45

## 2024-12-06 RX ADMIN — OXYCODONE HYDROCHLORIDE 5 MG: 5 TABLET ORAL at 15:45

## 2024-12-06 RX ADMIN — LIDOCAINE 1 PATCH: 50 PATCH CUTANEOUS at 15:45

## 2024-12-06 RX ADMIN — KETOROLAC TROMETHAMINE 15 MG: 30 INJECTION, SOLUTION INTRAMUSCULAR; INTRAVENOUS at 19:24

## 2024-12-06 NOTE — ED CARE HANDOFF
Emergency Department Sign Out Note        Sign out and transfer of care from ***. See Separate Emergency Department note.     The patient, Ken Baker, was evaluated by the previous provider for ***.    Workup Completed:  ***    ED Course / Workup Pending (followup):  ***                                     Procedures  Medical Decision Making        Disposition  Final diagnoses:   None     ED Disposition       None          Follow-up Information    None       Patient's Medications   Discharge Prescriptions    No medications on file     No discharge procedures on file.       ED Provider  Electronically Signed by

## 2024-12-06 NOTE — DISCHARGE INSTRUCTIONS
Your CT showed that you have compression fractures of 2 vertebrae in the middle of your back, T9 and T10.  The neurosurgery team recommended you wear a back brace, which is fitted specifically to you  You will follow up with Spine/Neurosurgery in 2 weeks  You should alternate taking the different medications. We discussed for multimodal pain control-for example, take Tylenol, then 2 hours later take Motrin then 2 hours later take gabapentin…  No heavy lifting, or bending/stooping/twisting etc until cleared Spine/Neurosurgery  Return to the ED immediately if you develop numbness or tingling, weakness in the legs, sharp shooting pains down legs, loss of bladder or bowel control, numbness in the genital area.

## 2024-12-06 NOTE — ED PROVIDER NOTES
ED Disposition       None          Assessment & Plan       MDM  24 year old who fell from a height >10 feet two days ago. He landed on his feet and then on to his back. No loss of consciousness and he did not hit his head. Trauma exam was benign except for tenderness to palpation in the R lower chest, R flank, and low back bilaterally. Morphine was given for pain control, labs not suggestive of acute blood loss or hematoma. Plans to do CT chest abdomen pelvis.        Medications   morphine injection 4 mg (4 mg Intravenous Given 12/6/24 1150)       ED Risk Strat Scores                                               History of Present Illness       Chief Complaint   Patient presents with    Back Pain     Pt reports 2 days prior falling off roof to a balcony landed on feet and fell back on back. Pt reports tailbone pain that goes assisted up the back.       Past Medical History:   Diagnosis Date    Asthma       Past Surgical History:   Procedure Laterality Date    EAR TUBE REMOVAL      TONSILLECTOMY        No family history on file.   Social History     Tobacco Use    Smoking status: Never    Smokeless tobacco: Never   Vaping Use    Vaping status: Never Used   Substance Use Topics    Alcohol use: No    Drug use: No      E-Cigarette/Vaping    E-Cigarette Use Never User       E-Cigarette/Vaping Substances    Nicotine No     THC No     CBD No     Flavoring No     Other No     Unknown No       I have reviewed and agree with the history as documented.     Ken Baker is a 24 year old male with no PMH who presented after a fall. He is a  and was working on a roof two days ago when he lost his balance and feel ten feet onto a balcony. He landed on his feet and then on his back. He also hit is side on a railing that bordered the balcony. He never hit his head and didn't lose consciousness. He noted no bruising at the time so didn't come in to the hospital. He tried icy hot and over the counter meds to no relief and so  came in today. He notes pain with inspiration and continued pain in his R chest and low back.           Review of Systems        Objective       ED Triage Vitals [12/06/24 1039]   Temperature Pulse Blood Pressure Respirations SpO2 Patient Position - Orthostatic VS   97.9 °F (36.6 °C) 69 131/60 16 99 % Sitting      Temp Source Heart Rate Source BP Location FiO2 (%) Pain Score    Oral Monitor Right arm -- 7      Vitals      Date and Time Temp Pulse SpO2 Resp BP Pain Score FACES Pain Rating User   12/06/24 1150 -- -- -- -- -- 8 -- PEDRO LUIS   12/06/24 1039 97.9 °F (36.6 °C) 69 99 % 16 131/60 7 -- CS            Physical Exam  Constitutional:       Appearance: Normal appearance.   HENT:      Head: Normocephalic and atraumatic.      Right Ear: Tympanic membrane normal.      Left Ear: Tympanic membrane normal.   Cardiovascular:      Rate and Rhythm: Normal rate and regular rhythm.      Heart sounds: Normal heart sounds.   Pulmonary:      Effort: Pulmonary effort is normal. No respiratory distress.      Breath sounds: Normal breath sounds. No stridor. No wheezing, rhonchi or rales.   Chest:      Chest wall: Tenderness present.   Abdominal:      General: Abdomen is flat. Bowel sounds are normal.      Palpations: Abdomen is soft.      Tenderness: There is no abdominal tenderness. There is no guarding or rebound.   Musculoskeletal:         General: No tenderness.      Cervical back: Normal range of motion. No tenderness.      Right lower leg: No edema.      Left lower leg: No edema.      Comments: Tenderness at R lower chest, sacrum bialterallly, and along T spine     Skin:     General: Skin is warm and dry.   Neurological:      Mental Status: He is alert.         Results Reviewed       Procedure Component Value Units Date/Time    Basic metabolic panel [[id number]] Collected: 12/06/24 1150    Lab Status: Final result Specimen: Blood from Arm, Left Updated: 12/06/24 1225     Sodium 138 mmol/L      Potassium 3.8 mmol/L      Chloride 104  mmol/L      CO2 29 mmol/L      ANION GAP 5 mmol/L      BUN 21 mg/dL      Creatinine 0.70 mg/dL      Glucose 101 mg/dL      Calcium 9.1 mg/dL      eGFR 130 ml/min/1.73sq m     Narrative:      National Kidney Disease Foundation guidelines for Chronic Kidney Disease (CKD):     Stage 1 with normal or high GFR (GFR > 90 mL/min/1.73 square meters)    Stage 2 Mild CKD (GFR = 60-89 mL/min/1.73 square meters)    Stage 3A Moderate CKD (GFR = 45-59 mL/min/1.73 square meters)    Stage 3B Moderate CKD (GFR = 30-44 mL/min/1.73 square meters)    Stage 4 Severe CKD (GFR = 15-29 mL/min/1.73 square meters)    Stage 5 End Stage CKD (GFR <15 mL/min/1.73 square meters)  Note: GFR calculation is accurate only with a steady state creatinine    CBC and differential [[id number]] Collected: 12/06/24 1150    Lab Status: Final result Specimen: Blood from Arm, Left Updated: 12/06/24 1200     WBC 6.16 Thousand/uL      RBC 4.59 Million/uL      Hemoglobin 14.6 g/dL      Hematocrit 42.4 %      MCV 92 fL      MCH 31.8 pg      MCHC 34.4 g/dL      RDW 11.9 %      MPV 9.7 fL      Platelets 271 Thousands/uL      nRBC 0 /100 WBCs      Segmented % 59 %      Immature Grans % 0 %      Lymphocytes % 29 %      Monocytes % 9 %      Eosinophils Relative 2 %      Basophils Relative 1 %      Absolute Neutrophils 3.65 Thousands/µL      Absolute Immature Grans 0.01 Thousand/uL      Absolute Lymphocytes 1.76 Thousands/µL      Absolute Monocytes 0.58 Thousand/µL      Eosinophils Absolute 0.11 Thousand/µL      Basophils Absolute 0.05 Thousands/µL             CT chest abdomen pelvis wo contrast    (Results Pending)       Procedures    ED Medication and Procedure Management   Prior to Admission Medications   Prescriptions Last Dose Informant Patient Reported? Taking?   dicyclomine (BENTYL) 20 mg tablet   No No   Sig: Take 1 tablet (20 mg total) by mouth 2 (two) times a day   ondansetron (ZOFRAN) 4 mg tablet   No No   Sig: Take 1 tablet (4 mg total) by mouth every 6 (six)  hours   ondansetron (ZOFRAN) 4 mg tablet   No No   Sig: Take 1 tablet (4 mg total) by mouth every 6 (six) hours      Facility-Administered Medications: None     Patient's Medications   Discharge Prescriptions    No medications on file     No discharge procedures on file.  ED SEPSIS DOCUMENTATION            Lalo Summers MD  12/06/24 3958

## 2024-12-06 NOTE — Clinical Note
Pia Bauman accompanied Ken Baker to the emergency department on 12/6/2024.    Return date if applicable:         If you have any questions or concerns, please don't hesitate to call.      Uzma Pozo MD

## 2024-12-06 NOTE — ED ATTENDING ATTESTATION
12/6/2024  I, Melania Moreno MD, saw and evaluated the patient. I have discussed the patient with the resident/non-physician practitioner and agree with the resident's/non-physician practitioner's findings, Plan of Care, and MDM as documented in the resident's/non-physician practitioner's note, except where noted. All available labs and Radiology studies were reviewed.  I was present for key portions of any procedure(s) performed by the resident/non-physician practitioner and I was immediately available to provide assistance.       At this point I agree with the current assessment done in the Emergency Department.  I have conducted an independent evaluation of this patient a history and physical is as follows:  This is a 24-year-old male who presents to the emergency department with back pain.  Patient states that 2 days ago, he was working on the roof and lost his balance.  Patient states that he tried to land on the balcony, and in so doing landed on his feet on the balcony, slipped, and struck his back on the railing before falling to the ground.  Patient did not strike his head.  Patient has been having ongoing back and right sided rib pain since that time.  He has not had any blood in his urine.  He has not had pain in his feet.  He has been ambulatory.  He denies numbness, tingling, weakness, denies abdominal pain, does state that the pain is worse with breathing, twisting, or moving.  No prior injuries.  No history of osteoporosis or bone disease.  No prior surgeries.  Review of systems otherwise negative in 12 systems reviewed.  On exam the patient is normal vital signs.  His HEENT exam is normal.  He has no malocclusion.  He has no signs of trauma.  He has no hemotympanum.  His neck is supple and nontender.  He has a little bit of left trapezius tenderness and spasm.  The patient has no chest wall step-offs or crepitus.  He does have right sided lateral chest wall tenderness.  There is no subcutaneous  air.  His lungs are clear and equal bilaterally.  His abdomen is soft and nontender.  The patient has midline lumbar spine tenderness.  There is no bruising or ecchymosis.  His pelvis is stable to rock.  He is able to range all joints and is neurovascularly intact.MEDICAL DECISION MAKING    Number and Complexity of Problems  Differential diagnosis: Fall, concern for back fracture, rib fracture    Medical Decision Making Data  External documents reviewed: ***  My EKG interpretation: ***  My CT interpretation: ***  My X-ray interpretation: ***  My ultrasound interpretation: ***    CT chest abdomen pelvis wo contrast    (Results Pending)       Labs Reviewed - No data to display    Labs reviewed by me are significant for: ***    Clinical decision rules/scores are significant for: ***    Discussed case with: ***  Considered admission for: ***    Treatment and Disposition  ED course: Patient seen and examined.  Will plan to CT chest abdomen pelvis without contrast, treat symptoms  Shared decision making: ***  Code status: ***     ED Course         Critical Care Time  Procedures       49.3 % Final    MCV 92  82 - 98 fL Final    MCH 31.8  26.8 - 34.3 pg Final    MCHC 34.4  31.4 - 37.4 g/dL Final    RDW 11.9  11.6 - 15.1 % Final    MPV 9.7  8.9 - 12.7 fL Final    Platelets 271  149 - 390 Thousands/uL Final    nRBC 0  /100 WBCs Final    Segmented % 59  43 - 75 % Final    Immature Grans % 0  0 - 2 % Final    Lymphocytes % 29  14 - 44 % Final    Monocytes % 9  4 - 12 % Final    Eosinophils Relative 2  0 - 6 % Final    Basophils Relative 1  0 - 1 % Final    Absolute Neutrophils 3.65  1.85 - 7.62 Thousands/µL Final    Absolute Immature Grans 0.01  0.00 - 0.20 Thousand/uL Final    Absolute Lymphocytes 1.76  0.60 - 4.47 Thousands/µL Final    Absolute Monocytes 0.58  0.17 - 1.22 Thousand/µL Final    Eosinophils Absolute 0.11  0.00 - 0.61 Thousand/µL Final    Basophils Absolute 0.05  0.00 - 0.10 Thousands/µL Final   BASIC METABOLIC PANEL    Sodium 138  135 - 147 mmol/L Final    Potassium 3.8  3.5 - 5.3 mmol/L Final    Chloride 104  96 - 108 mmol/L Final    CO2 29  21 - 32 mmol/L Final    ANION GAP 5  4 - 13 mmol/L Final    BUN 21  5 - 25 mg/dL Final    Creatinine 0.70  0.60 - 1.30 mg/dL Final    Comment: Standardized to IDMS reference method    Glucose 101  65 - 140 mg/dL Final    Comment: If the patient is fasting, the ADA then defines impaired fasting glucose as > 100 mg/dL and diabetes as > or equal to 123 mg/dL.    Calcium 9.1  8.4 - 10.2 mg/dL Final    eGFR 130  ml/min/1.73sq m Final    Narrative:     National Kidney Disease Foundation guidelines for Chronic Kidney Disease (CKD):     Stage 1 with normal or high GFR (GFR > 90 mL/min/1.73 square meters)    Stage 2 Mild CKD (GFR = 60-89 mL/min/1.73 square meters)    Stage 3A Moderate CKD (GFR = 45-59 mL/min/1.73 square meters)    Stage 3B Moderate CKD (GFR = 30-44 mL/min/1.73 square meters)    Stage 4 Severe CKD (GFR = 15-29 mL/min/1.73 square meters)    Stage 5 End Stage CKD (GFR <15 mL/min/1.73 square meters)  Note: GFR calculation is accurate only with  a steady state creatinine       Labs reviewed by me are significant for:     Clinical decision rules/scores are significant for:     Discussed case with:   Considered admission for:     Treatment and Disposition  ED course: Patient seen and examined.  Will plan to CT chest abdomen pelvis without contrast, treat symptoms  Shared decision making:   Code status:      ED Course         Critical Care Time  Procedures

## 2024-12-06 NOTE — H&P
"H&P - Trauma   Name: Ken Baekr 24 y.o. male I MRN: [id number]  Unit/Bed#: ED 01 I Date of Admission: 12/6/2024   Date of Service: 12/6/2024 I Hospital Day: 0     Assessment & Plan  Fall  S/p mechanical fall 10ft from roof onto balcony. No headstrike or LOC. Not on blood thinners. CT thoracolumbar demonstrates \"light step-offs involving the anterior cortex of the mild superior endplate compression fractures of T9 and T10.\"    Plan:  Pain control as needed  TLSO brace when OOB  Outpatient follow-up with neurosurgery in 2 weeks  Thoracolumbar XR in brace     History of Present Illness   Chief Complaint: back pain   Mechanism:Fall     Ken Baker is a 24 y.o. male who presents to the [facility] ER after falling off of a roof approximately 10 feet into a balcony two days prior to arrival. He landed on his legs and slid onto his back. Denies headstrike or LOC. No significant past medical history. He felt immediate pain in his back and tailbone that did not radiate and has worsened over the past two days despite ibuprofen and tylenol but did improve with icy hot. Denies numbness and tingling, bowel and bladder incontinence, fever, nausea, vomiting, abdominal pain, headache, and extremity pain.     Review of Systems  I have reviewed the patient's PMH, PSH, Social History, Family History, Meds, and Allergies  Immunization History   Administered Date(s) Administered    DTaP 5 1998, 1998, 03/09/1999, 03/16/2000, 03/06/2003    HPV Quadrivalent 01/28/2011, 08/03/2011, 01/30/2012    Hep A, adult 01/28/2011, 08/26/2011    Hep B, adult 1998, 1998, 03/09/1999    Hib (PRP-OMP) 1998, 1998, 03/09/1999, 10/11/1999    Influenza, seasonal, injectable 01/18/2005, 12/14/2006, 01/28/2011, 11/14/2012    MMR 10/11/1999, 03/06/2003    Meningococcal, Unknown Serogroups 03/16/2009, 08/22/2014    OPV 1998, 1998, 03/16/2000, 03/06/2003    Tdap 01/28/2011    Varicella 10/11/1999, 01/28/2011     Last " Tetanus: 2011     Objective :  Temp:  [97.9 °F (36.6 °C)] 97.9 °F (36.6 °C)  HR:  [69] 69  BP: (131)/(60) 131/60  Resp:  [16] 16  SpO2:  [99 %] 99 %  O2 Device: None (Room air)    Initial Vitals:   Temperature: 97.9 °F (36.6 °C) (12/06/24 1039)  Pulse: 69 (12/06/24 1039)  Respirations: 16 (12/06/24 1039)  Blood Pressure: 131/60 (12/06/24 1039)    Primary Survey:   Airway:        Status: patent;        Pre-hospital Interventions: none        Hospital Interventions: none  Breathing:        Pre-hospital Interventions: none       Effort: normal       Right breath sounds: normal       Left breath sounds: normal  Circulation:        Rhythm: regular       Rate: regular   Right Pulses Left Pulses    R radial: 2+    R pedal: 2+     L radial: 2+    L pedal: 2+       Disability:        GCS: Eye: 4; Verbal: 5 Motor: 6 Total: 15       Right Pupil: round;  reactive         Left Pupil:  round;  reactive      R Motor Strength L Motor Strength    R : 5/5  R dorsiflex: 5/5  R plantarflex: 5/5 L : 5/5  L dorsiflex: 5/5  L plantarflex: 5/5        Sensory:  No sensory deficit  Exposure:       Completed: No      Secondary Survey:  Physical Exam  Constitutional:       General: He is not in acute distress.     Appearance: Normal appearance.   HENT:      Head: Normocephalic.   Eyes:      Extraocular Movements: Extraocular movements intact.      Pupils: Pupils are equal, round, and reactive to light.   Cardiovascular:      Rate and Rhythm: Normal rate and regular rhythm.      Pulses: Normal pulses.   Pulmonary:      Effort: Pulmonary effort is normal. No respiratory distress.   Abdominal:      General: There is no distension.      Palpations: Abdomen is soft.   Musculoskeletal:         General: No swelling or tenderness. Normal range of motion.      Cervical back: Normal range of motion. No tenderness.   Neurological:      General: No focal deficit present.      Mental Status: He is alert and oriented to person, place, and time.       Cranial Nerves: No cranial nerve deficit.      Sensory: No sensory deficit.      Motor: No weakness.   Psychiatric:         Mood and Affect: Mood normal.             Lab Results: I have reviewed the following results:  Recent Labs     12/06/24  1150   WBC 6.16   HGB 14.6   HCT 42.4      SODIUM 138   K 3.8      CO2 29   BUN 21   CREATININE 0.70   GLUC 101       Imaging Results: I have personally reviewed pertinent images saved in PACS. CT scan findings (and other pertinent positive findings on images) were discussed with radiology. My interpretation of the images/reports are as follows:  Chest Xray(s): negative for acute findings   FAST exam(s): N/A   CT Scan(s): positive for acute findings: light step-offs involving the anterior cortex of the mild superior endplate compression fractures of T9 and T10   Additional Xray(s): pending     Other Studies: Other Study Results Review: No additional pertinent studies reviewed.

## 2024-12-07 NOTE — TELEMEDICINE
e-Consult (IPC)     Contacted by Bret Hylton.    Ken Baker 24 y.o. male MRN: [id number]  Unit/Bed#: ED 01 Encounter: [id number]    Reason for Consult    Per provider report, patient presents s/p 10 ft fall off of a roof 2 days PTA. No Ac/AP medications. No head strike. + back pain. Available past medical history,social history, surgical history, medication list, drug allergies and review of systems were reviewed.    /60 (BP Location: Right arm)   Pulse 69   Temp 97.9 °F (36.6 °C) (Oral)   Resp 16   Wt 86.2 kg (190 lb)   SpO2 99%   BMI 26.50 kg/m²      Clinical exam per provider report, no focal deficits.    Imaging personally reviewed.   CT thoracolumbar spine, 12/6/24: mild SEP compression fx T9, T10  XR thoracolumbar spine, 12/6/24: final read pending. Stable alignment.    Assessment and Recommendations    1. TLSO brace when OOB and HOB > 45 degrees  2. Upright XR reviewed in ED and stable  3. Pain control  4. Follow up in our office in 2 weeks with repeat XR    All questions answered. Provider is in agreement with the course of action. 11-20 minutes, >50% of the total time devoted to medical consultative verbal/EMR discussion between providers. Written report will be generated in the EMR.

## 2024-12-07 NOTE — ASSESSMENT & PLAN NOTE
"S/p mechanical fall 10ft from roof onto balcony. No headstrike or LOC. Not on blood thinners. CT thoracolumbar demonstrates \"light step-offs involving the anterior cortex of the mild superior endplate compression fractures of T9 and T10.\"    Plan:  Pain control as needed  TLSO brace when OOB  Outpatient follow-up with neurosurgery in 2 weeks  Thoracolumbar XR in brace   "

## 2024-12-09 ENCOUNTER — TELEPHONE (OUTPATIENT)
Dept: NEUROSURGERY | Facility: CLINIC | Age: 24
End: 2024-12-09

## 2024-12-09 NOTE — TELEPHONE ENCOUNTER
12/9/24 - PT DISCHARGED TO HOME. CALLED PT AND [facility] CONFIRMING APT IN [place] W/XR NEEDED PRIOR  12/26/24 2 WK HFU W/XR PER [facility]  Please schedule 2 week f/u with AP, XR thoracolumbar prior

## 2024-12-24 ENCOUNTER — TELEPHONE (OUTPATIENT)
Dept: NEUROSURGERY | Facility: CLINIC | Age: 24
End: 2024-12-24

## 2024-12-24 PROBLEM — S22.000A THORACIC COMPRESSION FRACTURE (HCC): Status: ACTIVE | Noted: 2024-12-24

## 2024-12-30 ENCOUNTER — HOSPITAL ENCOUNTER (EMERGENCY)
Facility: HOSPITAL | Age: 24
Discharge: HOME | End: 2024-12-30
Attending: EMERGENCY MEDICINE
Payer: COMMERCIAL

## 2024-12-30 VITALS
HEART RATE: 101 BPM | RESPIRATION RATE: 18 BRPM | TEMPERATURE: 97.9 F | DIASTOLIC BLOOD PRESSURE: 66 MMHG | SYSTOLIC BLOOD PRESSURE: 128 MMHG | OXYGEN SATURATION: 97 %

## 2024-12-30 DIAGNOSIS — R11.2 NAUSEA & VOMITING: Primary | ICD-10-CM

## 2024-12-30 DIAGNOSIS — R19.7 DIARRHEA: ICD-10-CM

## 2024-12-30 PROCEDURE — 99283 EMERGENCY DEPT VISIT LOW MDM: CPT

## 2024-12-30 PROCEDURE — 99284 EMERGENCY DEPT VISIT MOD MDM: CPT | Performed by: EMERGENCY MEDICINE

## 2024-12-30 RX ORDER — ONDANSETRON 4 MG/1
4 TABLET, ORALLY DISINTEGRATING ORAL EVERY 6 HOURS PRN
Qty: 30 TABLET | Refills: 0 | Status: SHIPPED | OUTPATIENT
Start: 2024-12-30

## 2024-12-30 RX ORDER — ONDANSETRON 4 MG/1
4 TABLET, ORALLY DISINTEGRATING ORAL ONCE
Status: COMPLETED | OUTPATIENT
Start: 2024-12-30 | End: 2024-12-30

## 2024-12-30 RX ORDER — ACETAMINOPHEN 325 MG/1
975 TABLET ORAL ONCE
Status: COMPLETED | OUTPATIENT
Start: 2024-12-30 | End: 2024-12-30

## 2024-12-30 RX ORDER — MAGNESIUM HYDROXIDE/ALUMINUM HYDROXICE/SIMETHICONE 120; 1200; 1200 MG/30ML; MG/30ML; MG/30ML
30 SUSPENSION ORAL ONCE
Status: COMPLETED | OUTPATIENT
Start: 2024-12-30 | End: 2024-12-30

## 2024-12-30 RX ADMIN — ALUMINUM HYDROXIDE, MAGNESIUM HYDROXIDE, AND SIMETHICONE 30 ML: 200; 200; 20 SUSPENSION ORAL at 06:27

## 2024-12-30 RX ADMIN — ACETAMINOPHEN 975 MG: 325 TABLET, FILM COATED ORAL at 05:46

## 2024-12-30 RX ADMIN — ONDANSETRON 4 MG: 4 TABLET, ORALLY DISINTEGRATING ORAL at 05:46

## 2024-12-30 NOTE — ED PROVIDER NOTES
"  ED Disposition       None          Assessment & Plan   {Hyperlinks  Risk Stratification - NIHSS - HEART SCORE - Fill out sepsis note and make sure you call [phone] if severe or septic shock:[id number]}    MDM  N/V/D since 8 yesterday morning  Multiple episodes of vomiting   No blood  Some bile in vomiting   No fever   No prior abdominal surgery  Sdiffuse abdominal ttp  Has t spine brace on - back pain       Medications - No data to display    ED Risk Strat Scores                                              History of Present Illness   {Hyperlinks  History (Med, Surg, Fam, Social) - Current Medications - Allergies  :[id number]}    Chief Complaint   Patient presents with    Vomiting     Pt c/o vomiting starting today, states \"can't keep down water\". Also c/o of diarrhea.        Past Medical History:   Diagnosis Date    Asthma       Past Surgical History:   Procedure Laterality Date    EAR TUBE REMOVAL      TONSILLECTOMY        History reviewed. No pertinent family history.   Social History     Tobacco Use    Smoking status: Never    Smokeless tobacco: Never   Vaping Use    Vaping status: Never Used   Substance Use Topics    Alcohol use: No    Drug use: No      E-Cigarette/Vaping    E-Cigarette Use Never User       E-Cigarette/Vaping Substances    Nicotine No     THC No     CBD No     Flavoring No     Other No     Unknown No       I have reviewed and agree with the history as documented.     HPI    Review of Systems        Objective   {Hyperlinks  Historical Vitals - Historical Labs - Chart Review/Microbiology - Last Echo - Code Status  :[id number]}    ED Triage Vitals [12/30/24 0532]   Temp Pulse Blood Pressure Respirations SpO2 Patient Position - Orthostatic VS   -- 101 128/66 18 97 % Sitting      Temp src Heart Rate Source BP Location FiO2 (%) Pain Score    -- Monitor Left arm -- --      Vitals      Date and Time Temp Pulse SpO2 Resp BP Pain Score FACES Pain Rating User   12/30/24 0532 -- 101 97 % 18 128/66 -- -- " BK            Physical Exam    Results Reviewed       None            No orders to display       Procedures    ED Medication and Procedure Management   Prior to Admission Medications   Prescriptions Last Dose Informant Patient Reported? Taking?   acetaminophen (TYLENOL) 650 mg CR tablet   No No   Sig: Take 1 tablet (650 mg total) by mouth every 8 (eight) hours as needed for mild pain   dicyclomine (BENTYL) 20 mg tablet   No No   Sig: Take 1 tablet (20 mg total) by mouth 2 (two) times a day   gabapentin (Neurontin) 300 mg capsule   No No   Sig: Take 1 capsule (300 mg total) by mouth 3 (three) times a day For post-herpetic neuralgia: Take 1 tablet on day 1,  Then take 2 tablets on day 2, Then take 3 tablets on day 3 and every day after that as instructed by your doctor.   ibuprofen (MOTRIN) 400 mg tablet   No No   Sig: Take 1 tablet (400 mg total) by mouth every 6 (six) hours as needed for mild pain   lidocaine (Lidoderm) 5 %   No No   Sig: Apply 1 patch topically over 12 hours daily Remove & Discard patch within 12 hours or as directed by MD   methocarbamol (ROBAXIN) 500 mg tablet   No No   Sig: Take 1 tablet (500 mg total) by mouth 2 (two) times a day   ondansetron (ZOFRAN) 4 mg tablet   No No   Sig: Take 1 tablet (4 mg total) by mouth every 6 (six) hours   ondansetron (ZOFRAN) 4 mg tablet   No No   Sig: Take 1 tablet (4 mg total) by mouth every 6 (six) hours      Facility-Administered Medications: None     Patient's Medications   Discharge Prescriptions    No medications on file     No discharge procedures on file.  ED SEPSIS DOCUMENTATION          yesterday morning.  States that he has had multiple episodes of nonbloody emesis which has contained some.  Denies any stool.  Denies any fever or prior abdominal surgery, denies any urinary symptoms or testicular pain.       Review of Systems   Constitutional:  Negative for fever.   HENT:  Negative for congestion.    Eyes:  Negative for pain.   Respiratory:  Negative for cough.    Cardiovascular:  Negative for chest pain.   Gastrointestinal:  Positive for diarrhea, nausea and vomiting. Negative for abdominal pain.   Genitourinary:  Negative for dysuria.   Musculoskeletal:  Negative for back pain.   Skin:  Negative for rash.   Neurological:  Negative for dizziness.   All other systems reviewed and are negative.          Objective       ED Triage Vitals   Temperature Pulse Blood Pressure Respirations SpO2 Patient Position - Orthostatic VS   12/30/24 0544 12/30/24 0532 12/30/24 0532 12/30/24 0532 12/30/24 0532 12/30/24 0532   97.9 °F (36.6 °C) 101 128/66 18 97 % Sitting      Temp Source Heart Rate Source BP Location FiO2 (%) Pain Score    12/30/24 0544 12/30/24 0532 12/30/24 0532 -- 12/30/24 0546    Oral Monitor Left arm  7      Vitals      Date and Time Temp Pulse SpO2 Resp BP Pain Score FACES Pain Rating User   12/30/24 0546 -- -- -- -- -- 7 -- MS   12/30/24 0544 97.9 °F (36.6 °C) -- -- -- -- -- -- BK   12/30/24 0532 -- 101 97 % 18 128/66 -- -- BK            Physical Exam  Vitals and nursing note reviewed.   Constitutional:       Appearance: Normal appearance.   HENT:      Head: Normocephalic and atraumatic.      Mouth/Throat:      Mouth: Mucous membranes are moist.   Eyes:      Conjunctiva/sclera: Conjunctivae normal.   Cardiovascular:      Rate and Rhythm: Normal rate and regular rhythm.      Pulses: Normal pulses.      Heart sounds: Normal heart sounds.   Pulmonary:      Effort: Pulmonary effort is normal.      Breath sounds: Normal breath sounds.   Abdominal:      Palpations: Abdomen is soft.      Tenderness:  There is no abdominal tenderness.   Musculoskeletal:         General: No tenderness.      Cervical back: Neck supple.   Skin:     General: Skin is warm and dry.      Capillary Refill: Capillary refill takes less than 2 seconds.   Neurological:      General: No focal deficit present.      Mental Status: He is alert. Mental status is at baseline.   Psychiatric:         Mood and Affect: Mood normal.         Results Reviewed       None            No orders to display       Procedures    ED Medication and Procedure Management   Prior to Admission Medications   Prescriptions Last Dose Informant Patient Reported? Taking?   acetaminophen (TYLENOL) 650 mg CR tablet   No No   Sig: Take 1 tablet (650 mg total) by mouth every 8 (eight) hours as needed for mild pain   dicyclomine (BENTYL) 20 mg tablet   No No   Sig: Take 1 tablet (20 mg total) by mouth 2 (two) times a day   gabapentin (Neurontin) 300 mg capsule   No No   Sig: Take 1 capsule (300 mg total) by mouth 3 (three) times a day For post-herpetic neuralgia: Take 1 tablet on day 1,  Then take 2 tablets on day 2, Then take 3 tablets on day 3 and every day after that as instructed by your doctor.   ibuprofen (MOTRIN) 400 mg tablet   No No   Sig: Take 1 tablet (400 mg total) by mouth every 6 (six) hours as needed for mild pain   lidocaine (Lidoderm) 5 %   No No   Sig: Apply 1 patch topically over 12 hours daily Remove & Discard patch within 12 hours or as directed by MD   methocarbamol (ROBAXIN) 500 mg tablet   No No   Sig: Take 1 tablet (500 mg total) by mouth 2 (two) times a day   ondansetron (ZOFRAN) 4 mg tablet   No No   Sig: Take 1 tablet (4 mg total) by mouth every 6 (six) hours   ondansetron (ZOFRAN) 4 mg tablet   No No   Sig: Take 1 tablet (4 mg total) by mouth every 6 (six) hours      Facility-Administered Medications: None     Discharge Medication List as of 12/30/2024  6:27 AM        START taking these medications    Details   ondansetron (ZOFRAN-ODT) 4 mg  disintegrating tablet Take 1 tablet (4 mg total) by mouth every 6 (six) hours as needed for nausea, Starting Mon 12/30/2024, Normal           CONTINUE these medications which have NOT CHANGED    Details   acetaminophen (TYLENOL) 650 mg CR tablet Take 1 tablet (650 mg total) by mouth every 8 (eight) hours as needed for mild pain, Starting Fri 12/6/2024, Normal      dicyclomine (BENTYL) 20 mg tablet Take 1 tablet (20 mg total) by mouth 2 (two) times a day, Starting Fri 3/22/2024, Normal      gabapentin (Neurontin) 300 mg capsule Take 1 capsule (300 mg total) by mouth 3 (three) times a day For post-herpetic neuralgia: Take 1 tablet on day 1,  Then take 2 tablets on day 2, Then take 3 tablets on day 3 and every day after that as instructed by your doctor., Starting Fri 12/6/2024,  Until Sun 1/5/2025, Normal      ibuprofen (MOTRIN) 400 mg tablet Take 1 tablet (400 mg total) by mouth every 6 (six) hours as needed for mild pain, Starting Fri 12/6/2024, Normal      lidocaine (Lidoderm) 5 % Apply 1 patch topically over 12 hours daily Remove & Discard patch within 12 hours or as directed by MD, Starting Fri 12/6/2024, Normal      methocarbamol (ROBAXIN) 500 mg tablet Take 1 tablet (500 mg total) by mouth 2 (two) times a day, Starting Fri 12/6/2024, Normal      !! ondansetron (ZOFRAN) 4 mg tablet Take 1 tablet (4 mg total) by mouth every 6 (six) hours, Starting Fri 3/22/2024, Normal      !! ondansetron (ZOFRAN) 4 mg tablet Take 1 tablet (4 mg total) by mouth every 6 (six) hours, Starting Mon 6/24/2024, Normal       !! - Potential duplicate medications found. Please discuss with provider.        No discharge procedures on file.  ED SEPSIS DOCUMENTATION   Time reflects when diagnosis was documented in both MDM as applicable and the Disposition within this note       Time User Action Codes Description Comment    12/30/2024  6:26 AM Suzi Navas [R11.2] Nausea & vomiting     12/30/2024  6:26 AM Suzi Navas [R19.7]  Diarrhea                  Suzi Navas, DO  01/13/25 2209

## 2024-12-30 NOTE — DISCHARGE INSTRUCTIONS
You were seen in the emergency department today for nausea, vomiting.    Follow up with her primary care provider.     Take Zofran as prescribed as needed for nausea/vomiting.     Return to the emergency department for any new or concerning symptoms including worsening abdominal pain, repeated vomiting even with taking Zofran.     Thank you for choosing [facility] for your care today.

## 2024-12-30 NOTE — Clinical Note
Ken Baker was seen and treated in our emergency department on 12/30/2024.    No restrictions            Diagnosis:     Ken  .    He may return on this date: 12/31/2024         If you have any questions or concerns, please don't hesitate to call.      Suzi Navas, DO    ______________________________           _______________          _______________  Hospital Representative                              Date                                Time

## 2024-12-31 NOTE — ED ATTENDING ATTESTATION
12/30/2024  I, Cy Morfin DO, saw and evaluated the patient. I have discussed the patient with the resident/non-physician practitioner and agree with the resident's/non-physician practitioner's findings, Plan of Care, and MDM as documented in the resident's/non-physician practitioner's note, except where noted. All available labs and Radiology studies were reviewed.  I was present for key portions of any procedure(s) performed by the resident/non-physician practitioner and I was immediately available to provide assistance.       At this point I agree with the current assessment done in the Emergency Department.  I have conducted an independent evaluation of this patient a history and physical is as follows:    Patient is a 24-year-old male, says since about 8 AM yesterday morning he has had multiple episode of nonbloody, nonbilious emesis initially, last episode had some slight bile.  He is having some nonbloody, not mucousy diarrhea, no fever, no chills, no travel history, no recent hospitalizations, no recent antibiotics.  No known sick contacts.  Denies previous abdominal surgery.  Patient had a fall December 4, seen in the emergency room in December 6, diagnosed with a T9-T10 compression fracture, has been placed in a TLSO brace.    General:  Patient is well-appearing  Head:  Atraumatic  Eyes:  Conjunctiva pink  ENT:  Mucous membranes are moist  Neck:  Supple  Cardiac:  S1-S2, without murmurs  Lungs:  Clear to auscultation bilaterally  Abdomen:  Soft, nontender, normal bowel sounds, no CVA tenderness, no tympany, no rigidity, no guarding  Extremities:  Normal range of motion  Neurologic:  Awake, fluent speech, normal comprehension. AAOx3.   Skin:  Pink warm and dry  Psychiatric:  Alert, pleasant, cooperative          ED Course     Medications   ondansetron (ZOFRAN-ODT) dispersible tablet 4 mg (4 mg Oral Given 12/30/24 0546)   acetaminophen (TYLENOL) tablet 975 mg (975 mg Oral Given 12/30/24 0546)    aluminum-magnesium hydroxide-simethicone (MAALOX) oral suspension 30 mL (30 mL Oral Given 12/30/24 0627)     On reassessment after symptomatic management, patient was feeling better, able to tolerate p.o.  At this point I suspect the patient most likely is suffering nausea, vomiting diarrheal syndrome given his lack of significant GI risk factors.  He does not appear to be clinically dehydrated, cannot tolerate p.o., do not believe that IV fluids are indicated or that laboratory studies are indicated.  I do not believe this represents acute appendicitis, or that abdominal imaging is indicated at this point.While the cause of the patient's complaints is most likely benign, it is possible that this is the early presentation of a more serious condition. This diagnostic uncertainty was discussed with the patient, as was the importance of follow up care, as well as the need to return to immediately return to the closest emergency department for bloody diarrhea, unable to tolerate p.o., right lower quadrant abdominal pain,, the signs/symptoms in the discharge instruction sheets, or they were otherwise concerned about their medical condition. The patient stated they were aware of this diagnostic uncertainty, understood the importance of follow up and were comfortable being discharged.    DIAGNOSIS:  Nausea and vomiting, acute diarrhea, history of T9-T10 compression fracture    MEDICAL DECISION MAKING CODING    COLLECTION AND INTERPRETATION OF DATA  I reviewed prior external notes, including December 6, 2024 thoracic spine CT      Critical Care Time  Procedures

## 2025-01-14 ENCOUNTER — HOSPITAL ENCOUNTER (EMERGENCY)
Facility: HOSPITAL | Age: 25
Discharge: HOME | End: 2025-01-15
Attending: EMERGENCY MEDICINE
Payer: COMMERCIAL

## 2025-01-14 VITALS
RESPIRATION RATE: 18 BRPM | SYSTOLIC BLOOD PRESSURE: 134 MMHG | TEMPERATURE: 97.9 F | DIASTOLIC BLOOD PRESSURE: 84 MMHG | HEART RATE: 62 BPM | OXYGEN SATURATION: 98 %

## 2025-01-14 DIAGNOSIS — M54.9 BACK PAIN: ICD-10-CM

## 2025-01-14 DIAGNOSIS — M54.6 THORACIC BACK PAIN: ICD-10-CM

## 2025-01-14 DIAGNOSIS — S22.000S COMPRESSION FRACTURE OF THORACIC VERTEBRA, SEQUELA: Primary | ICD-10-CM

## 2025-01-14 PROCEDURE — 99283 EMERGENCY DEPT VISIT LOW MDM: CPT

## 2025-01-15 ENCOUNTER — APPOINTMENT (EMERGENCY)
Dept: RADIOLOGY | Facility: HOSPITAL | Age: 25
End: 2025-01-15
Payer: COMMERCIAL

## 2025-01-15 PROCEDURE — 99284 EMERGENCY DEPT VISIT MOD MDM: CPT | Performed by: EMERGENCY MEDICINE

## 2025-01-15 PROCEDURE — 96372 THER/PROPH/DIAG INJ SC/IM: CPT

## 2025-01-15 PROCEDURE — 72080 X-RAY EXAM THORACOLMB 2/> VW: CPT

## 2025-01-15 RX ORDER — ACETAMINOPHEN 325 MG/1
650 TABLET ORAL ONCE
Status: COMPLETED | OUTPATIENT
Start: 2025-01-15 | End: 2025-01-15

## 2025-01-15 RX ORDER — KETOROLAC TROMETHAMINE 30 MG/ML
15 INJECTION, SOLUTION INTRAMUSCULAR; INTRAVENOUS ONCE
Status: COMPLETED | OUTPATIENT
Start: 2025-01-15 | End: 2025-01-15

## 2025-01-15 RX ORDER — LIDOCAINE 50 MG/G
2 PATCH TOPICAL ONCE
Status: DISCONTINUED | OUTPATIENT
Start: 2025-01-15 | End: 2025-01-15 | Stop reason: HOSPADM

## 2025-01-15 RX ORDER — GABAPENTIN 100 MG/1
100 CAPSULE ORAL ONCE
Status: COMPLETED | OUTPATIENT
Start: 2025-01-15 | End: 2025-01-15

## 2025-01-15 RX ORDER — IBUPROFEN 400 MG/1
400 TABLET, FILM COATED ORAL EVERY 6 HOURS PRN
Qty: 60 TABLET | Refills: 0 | Status: SHIPPED | OUTPATIENT
Start: 2025-01-15

## 2025-01-15 RX ORDER — LIDOCAINE 50 MG/G
1 PATCH TOPICAL DAILY
Qty: 30 PATCH | Refills: 0 | Status: SHIPPED | OUTPATIENT
Start: 2025-01-15

## 2025-01-15 RX ORDER — METHOCARBAMOL 500 MG/1
500 TABLET, FILM COATED ORAL ONCE
Status: COMPLETED | OUTPATIENT
Start: 2025-01-15 | End: 2025-01-15

## 2025-01-15 RX ORDER — SENNOSIDES 8.6 MG
650 CAPSULE ORAL EVERY 8 HOURS PRN
Qty: 60 TABLET | Refills: 0 | Status: SHIPPED | OUTPATIENT
Start: 2025-01-15

## 2025-01-15 RX ORDER — METHOCARBAMOL 500 MG/1
500 TABLET, FILM COATED ORAL 2 TIMES DAILY
Qty: 20 TABLET | Refills: 0 | Status: SHIPPED | OUTPATIENT
Start: 2025-01-15

## 2025-01-15 RX ADMIN — KETOROLAC TROMETHAMINE 15 MG: 30 INJECTION, SOLUTION INTRAMUSCULAR; INTRAVENOUS at 01:11

## 2025-01-15 RX ADMIN — LIDOCAINE 2 PATCH: 700 PATCH TOPICAL at 01:12

## 2025-01-15 RX ADMIN — ACETAMINOPHEN 650 MG: 325 TABLET, FILM COATED ORAL at 01:12

## 2025-01-15 RX ADMIN — GABAPENTIN 100 MG: 100 CAPSULE ORAL at 01:53

## 2025-01-15 RX ADMIN — METHOCARBAMOL 500 MG: 500 TABLET ORAL at 01:12

## 2025-01-15 NOTE — ED ATTENDING ATTESTATION
1/14/2025  I, Marck Moreno MD, saw and evaluated the patient. I have discussed the patient with the resident/non-physician practitioner and agree with the resident's/non-physician practitioner's findings, Plan of Care, and MDM as documented in the resident's/non-physician practitioner's note, except where noted. All available labs and Radiology studies were reviewed.  I was present for key portions of any procedure(s) performed by the resident/non-physician practitioner and I was immediately available to provide assistance.       At this point I agree with the current assessment done in the Emergency Department.  I have conducted an independent evaluation of this patient a history and physical is as follows:    ED Course     24-year-old male, history of T9/T10 compression fracture after a fall in mid December, wearing a TLSO brace, scheduled follow-up with neurosurgery in 2 to 3 days, presenting to the emergency department for evaluation of continued midthoracic back pain.  Patient states his prescribed medications were not helping with this.  No bowel or bladder incontinence.  No saddle anesthesia.    Patient is resting comfortably on the stretcher.  Lungs are clear bilaterally.  Heart is regular rate and rhythm with no murmurs rubs or gallops.  Midthoracic back tenderness is noted.  No skin changes.  Abdomen is soft and nontender.  Motor is 5 out of 5 bilateral upper and lower extremities.    MEDICAL DECISION MAKING    Number and Complexity of Problems  Differential diagnosis: Midthoracic back pain secondary to T9 and T10 compression fracture.    Medical Decision Making Data  External documents reviewed: I reviewed the patient's previous admission to trauma at the time of fall.  My X-ray interpretation: No significant changes on x-ray.      XR spine thoracolumbar 2 vw   ED Interpretation   Unchanged from prior - no progression of previous compression fracture or acute findings           Treatment and  Disposition  ED course: Patient underwent x-ray that was to be performed prior to his neurosurgery visit in 2 to 3 days.  Patient was informed that I would not alter his outpatient pain medication regimen.  Recommended that when he sees neurosurgery, he requests a referral to pain management given his ongoing chronic pain.  Shared decision making: Patient agreeable with plan.  Code status: Full code.              Critical Care Time  Procedures

## 2025-01-15 NOTE — DISCHARGE INSTRUCTIONS
You were seen today for thoracic back pain; please continue to take the medications that were prescribed previously as these are the correct prescriptions for this type of pain. You can take motrin and tylenol every 6 hours for pain.

## 2025-01-15 NOTE — ED PROVIDER NOTES
Time reflects when diagnosis was documented in both MDM as applicable and the Disposition within this note       Time User Action Codes Description Comment    1/15/2025  1:43 AM ViolaCatherine khanna Add [M54.6] Thoracic back pain     1/15/2025  1:43 AM ViolaCatherine khanna Add [S22.000S] Compression fracture of thoracic vertebra, sequela     1/15/2025  1:43 AM ViolaDiancy Modify [M54.6] Thoracic back pain     1/15/2025  1:43 AM ViolaCatherine khanna Modify [S22.000S] Compression fracture of thoracic vertebra, sequela     1/15/2025  2:05 AM ViolaCatherine khanna Add [M54.9] Back pain           ED Disposition       ED Disposition   Discharge    Condition   Stable    Date/Time   Wed Emanuel 15, 2025  1:43 AM    Comment   Ken Baker discharge to home/self care.                   Assessment & Plan       Medical Decision Making  Amount and/or Complexity of Data Reviewed  Radiology: ordered and independent interpretation performed.    Risk  OTC drugs.  Prescription drug management.        Patient is a 24 y.o. male  PMH fall from roof 12/6/24 with thoracic compression fracture who presents to the ED with CC back pain. Had thoracic back pain from a fall seen in the ED earlier in December - is follow up with NSGY in two days for this. Has not gotten xrays done for this yet; plans to go tomorrow. States the last time he took medication was 4pm but they have not been helping him anyway. Has been wearing his TSLO brace and has been elevated when sleeping. Pain is a sharp pain in thoracic region. No numbness, weakness, or tingling. No bowel or bladder incontinence. This pain is the same pain he has had since time of injury. He denies any new features of pain include quality or location of pain.     Vital signs stable, normotensive. Exam as listed below.    Differential diagnosis includes but is not limited to musculoskeletal back pain from known thoracic spine injury.      Plan tylenol, motrin, gabapentin, robaxin, lidoderm. Patient to have thoracolumbar  xrays done for NSGY appointment - will do them here.     View ED course above for further discussion on patient workup.     All labs reviewed and utilized in the medical decision making process  All radiology studies independently viewed by me and interpreted by the radiologist.  I reviewed all testing with the patient.     Upon re-evaluation xray without any progression of disease per my read; renewed prescription for pain medications and stable for discharge home.         Medications   ketorolac (TORADOL) injection 15 mg (15 mg Intramuscular Given 1/15/25 0111)   methocarbamol (ROBAXIN) tablet 500 mg (500 mg Oral Given 1/15/25 0112)   acetaminophen (TYLENOL) tablet 650 mg (650 mg Oral Given 1/15/25 0112)   gabapentin (NEURONTIN) capsule 100 mg (100 mg Oral Given 1/15/25 0153)       ED Risk Strat Scores                                              History of Present Illness       Chief Complaint   Patient presents with    Back Pain     Pt fell off a roof last month & had a compression fx of T9 & T10; c/o back pain stating pain meds & muscle relaxer's aren't helping. Denies any trouble ambulating or incontinence issues        Past Medical History:   Diagnosis Date    Asthma     Thoracic spine fracture (HCC)       Past Surgical History:   Procedure Laterality Date    EAR TUBE REMOVAL      TONSILLECTOMY        History reviewed. No pertinent family history.   Social History     Tobacco Use    Smoking status: Never    Smokeless tobacco: Never   Vaping Use    Vaping status: Never Used   Substance Use Topics    Alcohol use: No    Drug use: No      E-Cigarette/Vaping    E-Cigarette Use Never User       E-Cigarette/Vaping Substances    Nicotine No     THC No     CBD No     Flavoring No     Other No     Unknown No       I have reviewed and agree with the history as documented.     24 y.o. male with CC back pain        Review of Systems   Constitutional:  Negative for activity change, appetite change, chills and fever.    HENT:  Negative for congestion and rhinorrhea.    Respiratory:  Negative for apnea, cough and shortness of breath.    Cardiovascular:  Negative for leg swelling.   Gastrointestinal:  Negative for abdominal distention, nausea and vomiting.   Musculoskeletal:  Positive for back pain. Negative for gait problem, neck pain and neck stiffness.   Skin:  Negative for color change and rash.   Psychiatric/Behavioral:  Negative for confusion.            Objective       ED Triage Vitals [01/14/25 2350]   Temperature Pulse Blood Pressure Respirations SpO2 Patient Position - Orthostatic VS   97.9 °F (36.6 °C) 62 134/84 18 98 % Lying      Temp Source Heart Rate Source BP Location FiO2 (%) Pain Score    Temporal Monitor Right arm -- 9      Vitals      Date and Time Temp Pulse SpO2 Resp BP Pain Score FACES Pain Rating User   01/15/25 0111 -- -- -- -- -- 9 -- RJ   01/14/25 2350 97.9 °F (36.6 °C) 62 98 % 18 134/84 9 -- PT            Physical Exam  Constitutional:       Appearance: Normal appearance.   HENT:      Head: Normocephalic and atraumatic.      Nose: Nose normal.   Eyes:      Pupils: Pupils are equal, round, and reactive to light.   Cardiovascular:      Rate and Rhythm: Normal rate.   Pulmonary:      Effort: Pulmonary effort is normal.   Abdominal:      General: Abdomen is flat.      Palpations: Abdomen is soft.   Musculoskeletal:      Cervical back: Normal range of motion and neck supple.   Skin:     General: Skin is warm and dry.      Capillary Refill: Capillary refill takes less than 2 seconds.   Neurological:      General: No focal deficit present.      Mental Status: He is alert and oriented to person, place, and time.      Cranial Nerves: No cranial nerve deficit.      Sensory: No sensory deficit.      Gait: Gait normal.         Results Reviewed       None            XR spine thoracolumbar 2 vw   ED Interpretation by Catherine Rod MD (01/15 2235)   Unchanged from prior - no progression of previous compression fracture  or acute findings       Final Interpretation by Boris Marte MD (01/15 0821)      Stable appearance of the thoracolumbar spine without progressive loss of height or malalignment at T9 or T10.      This report is in agreement with the preliminary interpretation.      Computerized Assisted Algorithm (CAA) may have been used to analyze all applicable images.      Workstation performed: [id number]             Procedures    ED Medication and Procedure Management   Prior to Admission Medications   Prescriptions Last Dose Informant Patient Reported? Taking?   acetaminophen (TYLENOL) 650 mg CR tablet   No No   Sig: Take 1 tablet (650 mg total) by mouth every 8 (eight) hours as needed for mild pain   acetaminophen (TYLENOL) 650 mg CR tablet   No Yes   Sig: Take 1 tablet (650 mg total) by mouth every 8 (eight) hours as needed for mild pain   dicyclomine (BENTYL) 20 mg tablet   No No   Sig: Take 1 tablet (20 mg total) by mouth 2 (two) times a day   gabapentin (Neurontin) 300 mg capsule   No No   Sig: Take 1 capsule (300 mg total) by mouth 3 (three) times a day For post-herpetic neuralgia: Take 1 tablet on day 1,  Then take 2 tablets on day 2, Then take 3 tablets on day 3 and every day after that as instructed by your doctor.   ibuprofen (MOTRIN) 400 mg tablet   No No   Sig: Take 1 tablet (400 mg total) by mouth every 6 (six) hours as needed for mild pain   ibuprofen (MOTRIN) 400 mg tablet   No Yes   Sig: Take 1 tablet (400 mg total) by mouth every 6 (six) hours as needed for mild pain   lidocaine (Lidoderm) 5 %   No No   Sig: Apply 1 patch topically over 12 hours daily Remove & Discard patch within 12 hours or as directed by MD   lidocaine (Lidoderm) 5 %   No Yes   Sig: Apply 1 patch topically over 12 hours daily Remove & Discard patch within 12 hours or as directed by MD   methocarbamol (ROBAXIN) 500 mg tablet   No No   Sig: Take 1 tablet (500 mg total) by mouth 2 (two) times a day   methocarbamol (ROBAXIN) 500 mg tablet    No Yes   Sig: Take 1 tablet (500 mg total) by mouth 2 (two) times a day   ondansetron (ZOFRAN) 4 mg tablet   No No   Sig: Take 1 tablet (4 mg total) by mouth every 6 (six) hours   ondansetron (ZOFRAN) 4 mg tablet   No No   Sig: Take 1 tablet (4 mg total) by mouth every 6 (six) hours   ondansetron (ZOFRAN-ODT) 4 mg disintegrating tablet   No No   Sig: Take 1 tablet (4 mg total) by mouth every 6 (six) hours as needed for nausea      Facility-Administered Medications: None     Discharge Medication List as of 1/15/2025  2:05 AM        CONTINUE these medications which have CHANGED    Details   acetaminophen (TYLENOL) 650 mg CR tablet Take 1 tablet (650 mg total) by mouth every 8 (eight) hours as needed for mild pain, Starting Wed 1/15/2025, Normal      ibuprofen (MOTRIN) 400 mg tablet Take 1 tablet (400 mg total) by mouth every 6 (six) hours as needed for mild pain, Starting Wed 1/15/2025, Normal      lidocaine (Lidoderm) 5 % Apply 1 patch topically over 12 hours daily Remove & Discard patch within 12 hours or as directed by MD, Starting Wed 1/15/2025, Normal      methocarbamol (ROBAXIN) 500 mg tablet Take 1 tablet (500 mg total) by mouth 2 (two) times a day, Starting Wed 1/15/2025, Normal           CONTINUE these medications which have NOT CHANGED    Details   dicyclomine (BENTYL) 20 mg tablet Take 1 tablet (20 mg total) by mouth 2 (two) times a day, Starting Fri 3/22/2024, Normal      gabapentin (Neurontin) 300 mg capsule Take 1 capsule (300 mg total) by mouth 3 (three) times a day For post-herpetic neuralgia: Take 1 tablet on day 1,  Then take 2 tablets on day 2, Then take 3 tablets on day 3 and every day after that as instructed by your doctor., Starting Fri 12/6/2024,  Until Sun 1/5/2025, Normal      !! ondansetron (ZOFRAN) 4 mg tablet Take 1 tablet (4 mg total) by mouth every 6 (six) hours, Starting Fri 3/22/2024, Normal      !! ondansetron (ZOFRAN) 4 mg tablet Take 1 tablet (4 mg total) by mouth every 6 (six)  hours, Starting Mon 6/24/2024, Normal      ondansetron (ZOFRAN-ODT) 4 mg disintegrating tablet Take 1 tablet (4 mg total) by mouth every 6 (six) hours as needed for nausea, Starting Mon 12/30/2024, Normal       !! - Potential duplicate medications found. Please discuss with provider.        No discharge procedures on file.  ED SEPSIS DOCUMENTATION   Time reflects when diagnosis was documented in both MDM as applicable and the Disposition within this note       Time User Action Codes Description Comment    1/15/2025  1:43 AM Catherine Rod Add [M54.6] Thoracic back pain     1/15/2025  1:43 AM Catherine Rod Add [S22.000S] Compression fracture of thoracic vertebra, sequela     1/15/2025  1:43 AM Catherine Rod Modify [M54.6] Thoracic back pain     1/15/2025  1:43 AM Catherine Rod Modify [S22.000S] Compression fracture of thoracic vertebra, sequela     1/15/2025  2:05 AM Catherine Rod Add [M54.9] Back pain                  Catherine Rod MD  01/16/25 0657

## 2025-01-16 NOTE — ASSESSMENT & PLAN NOTE
6 week follow up of T9 and T10 fractures, s/p fall off roof 12/6/24  Maintained in TLSO brace  Continues with 7/10 mid and low back pain that does not radiate. No BBI  Exam: midline spinal TTP in mid through low back. BLE 5/5, LT intact, 2+ DTRs, no clonus    Imaging:  XR thoracic 1/15/25: Stable appearance of the thoracolumbar spine without progressive loss of height or malalignment at T9 or T10   XR thoracic 12/6/24: No radiographic evidence of acute fracture or aggressive osseous lesion. Notably, the previously reported superior endplate cortical step-offs at T9-T10 are better characterized on prior CT exam.   CT a/p 12/6/24: 1. There are slight step-offs involving the anterior cortex of the mild superior endplate compression fractures of T9 and T10. This suggests acute findings.     Plan:  Reviewed imaging with patient.  His fractures appear stable, his alignment is intact. No other fractures seen.  No neurosurgical invention recommended at this time.  Unfortunately he continues with significant pain, though thankfully there are no deficits on exam.  Since he is not having any weakness or radiculopathy, would not recommend MRI at this time.  Continue TLSO brace for about 6-12 weeks from the injury.  Recommend wearing brace when upright, remove to shower and sleep.   Continue thoracic and lumbar spine precautions to prevent further injury.   No driving while in brace.  Follow up in 4 weeks with thoracolumbar spine XR to see AP. If he continues with severe pain, can consider additional imaging or referral to pain management.   Call neurosurgery office with questions or concerns.     Orders:    XR spine thoracolumbar 2 vw; Future

## 2025-01-17 ENCOUNTER — OFFICE VISIT (OUTPATIENT)
Dept: NEUROSURGERY | Facility: CLINIC | Age: 25
End: 2025-01-17
Payer: COMMERCIAL

## 2025-01-17 ENCOUNTER — TELEPHONE (OUTPATIENT)
Dept: NEUROSURGERY | Facility: CLINIC | Age: 25
End: 2025-01-17

## 2025-01-17 VITALS
DIASTOLIC BLOOD PRESSURE: 72 MMHG | HEART RATE: 105 BPM | TEMPERATURE: 98 F | RESPIRATION RATE: 18 BRPM | OXYGEN SATURATION: 98 % | SYSTOLIC BLOOD PRESSURE: 118 MMHG | BODY MASS INDEX: 28.42 KG/M2 | WEIGHT: 203 LBS | HEIGHT: 71 IN

## 2025-01-17 DIAGNOSIS — S22.000A THORACIC COMPRESSION FRACTURE (HCC): Primary | ICD-10-CM

## 2025-01-17 PROCEDURE — 99213 OFFICE O/P EST LOW 20 MIN: CPT | Performed by: PHYSICIAN ASSISTANT

## 2025-01-17 NOTE — PROGRESS NOTES
Name: Ken Baker      : 2000      MRN: [id number]  Encounter Provider: Chio Soares PA-C  Encounter Date: 2025   Encounter department: [facility] [place]  :  Assessment & Plan  Thoracic compression fracture (HCC)  6 week follow up of T9 and T10 fractures, s/p fall off roof 24  Maintained in TLSO brace  Continues with 7/10 mid and low back pain that does not radiate. No BBI  Exam: midline spinal TTP in mid through low back. BLE 5/5, LT intact, 2+ DTRs, no clonus    Imaging:  XR thoracic 1/15/25: Stable appearance of the thoracolumbar spine without progressive loss of height or malalignment at T9 or T10   XR thoracic 24: No radiographic evidence of acute fracture or aggressive osseous lesion. Notably, the previously reported superior endplate cortical step-offs at T9-T10 are better characterized on prior CT exam.   CT a/p 24: 1. There are slight step-offs involving the anterior cortex of the mild superior endplate compression fractures of T9 and T10. This suggests acute findings.     Plan:  Reviewed imaging with patient.  His fractures appear stable, his alignment is intact. No other fractures seen.  No neurosurgical invention recommended at this time.  Unfortunately he continues with significant pain, though thankfully there are no deficits on exam.  Since he is not having any weakness or radiculopathy, would not recommend MRI at this time.  Continue TLSO brace for about 6-12 weeks from the injury.  Recommend wearing brace when upright, remove to shower and sleep.   Continue thoracic and lumbar spine precautions to prevent further injury.   No driving while in brace.  Follow up in 4 weeks with thoracolumbar spine XR to see AP. If he continues with severe pain, can consider additional imaging or referral to pain management.   Call neurosurgery office with questions or concerns.     Orders:    XR spine thoracolumbar 2 vw; Future        History of Present  Illness   24 year old gentleman seen for 6-week follow-up of T9 and T10 fracture status post fall off roof 12/6/2024.  He has been maintained in a TLSO brace.  Reports that he continues to have 7/10 pain in his mid and low back that does not radiate.  No BBI.  Feels that his pain is worse with prolonged standing and at night.  The more he stands, that his legs feel weak.  Reports using the medications that have been given to him including lidocaine patches, Robaxin, gabapentin, ibuprofen without significant relief.  He was recently in the ED for pain and Toradol brought down his pain from 10/10 to 6/10.       Review of Systems   Constitutional: Negative.    HENT: Negative.     Eyes: Negative.    Respiratory: Negative.     Cardiovascular: Negative.    Gastrointestinal: Negative.    Endocrine: Negative.    Genitourinary: Negative.    Musculoskeletal:  Positive for back pain (LBP 7/10). Negative for gait problem and myalgias.        The longer he stands or longer jhe walks will have more pain in the lower back    Skin: Negative.    Allergic/Immunologic: Negative.    Neurological:  Positive for weakness (intermitten in both legs). Negative for numbness.   Hematological: Negative.    Psychiatric/Behavioral:  Positive for sleep disturbance (pain disrupts sleep at times).     I have personally reviewed the MA's review of systems and made changes as necessary.    Past Medical History   Past Medical History:   Diagnosis Date    Asthma     Thoracic spine fracture (HCC)      Past Surgical History:   Procedure Laterality Date    EAR TUBE REMOVAL      TONSILLECTOMY       History reviewed. No pertinent family history.   reports that he has never smoked. He has never used smokeless tobacco. He reports that he does not drink alcohol and does not use drugs.  Current Outpatient Medications on File Prior to Visit   Medication Sig Dispense Refill    acetaminophen (TYLENOL) 650 mg CR tablet Take 1 tablet (650 mg total) by mouth every 8  "(eight) hours as needed for mild pain 60 tablet 0    dicyclomine (BENTYL) 20 mg tablet Take 1 tablet (20 mg total) by mouth 2 (two) times a day 20 tablet 0    ibuprofen (MOTRIN) 400 mg tablet Take 1 tablet (400 mg total) by mouth every 6 (six) hours as needed for mild pain 60 tablet 0    lidocaine (Lidoderm) 5 % Apply 1 patch topically over 12 hours daily Remove & Discard patch within 12 hours or as directed by MD 30 patch 0    methocarbamol (ROBAXIN) 500 mg tablet Take 1 tablet (500 mg total) by mouth 2 (two) times a day 20 tablet 0    ondansetron (ZOFRAN) 4 mg tablet Take 1 tablet (4 mg total) by mouth every 6 (six) hours 12 tablet 0    ondansetron (ZOFRAN) 4 mg tablet Take 1 tablet (4 mg total) by mouth every 6 (six) hours 12 tablet 0    ondansetron (ZOFRAN-ODT) 4 mg disintegrating tablet Take 1 tablet (4 mg total) by mouth every 6 (six) hours as needed for nausea 30 tablet 0    gabapentin (Neurontin) 300 mg capsule Take 1 capsule (300 mg total) by mouth 3 (three) times a day For post-herpetic neuralgia: Take 1 tablet on day 1,  Then take 2 tablets on day 2, Then take 3 tablets on day 3 and every day after that as instructed by your doctor. 90 capsule 0     No current facility-administered medications on file prior to visit.     Allergies   Allergen Reactions    Penicillins Hives      Social History     Tobacco Use    Smoking status: Never    Smokeless tobacco: Never   Vaping Use    Vaping status: Never Used   Substance and Sexual Activity    Alcohol use: No    Drug use: No    Sexual activity: Not on file        Objective   /72 (BP Location: Left arm, Patient Position: Sitting, Cuff Size: Standard)   Pulse 105   Temp 98 °F (36.7 °C) (Temporal)   Resp 18   Ht 5' 11\" (1.803 m)   Wt 92.1 kg (203 lb)   SpO2 98%   BMI 28.31 kg/m²     Physical Exam  Vitals reviewed.   Constitutional:       General: He is awake.      Appearance: Normal appearance.   HENT:      Head: Normocephalic and atraumatic.   Eyes:     "  Conjunctiva/sclera: Conjunctivae normal.   Cardiovascular:      Rate and Rhythm: Normal rate.   Pulmonary:      Effort: Pulmonary effort is normal.   Musculoskeletal:      Comments: Wearing TLSO brace  Midline spinal TTP from mid back to sacrum   Skin:     General: Skin is warm and dry.   Neurological:      Mental Status: He is alert.      Deep Tendon Reflexes:      Reflex Scores:       Patellar reflexes are 2+ on the right side and 2+ on the left side.  Psychiatric:         Attention and Perception: Attention and perception normal.         Mood and Affect: Mood and affect normal.         Speech: Speech normal.         Behavior: Behavior normal. Behavior is cooperative.         Thought Content: Thought content normal.         Cognition and Memory: Cognition and memory normal.         Judgment: Judgment normal.       Neurological Exam  Mental Status  Awake and alert. Memory is normal. Speech is normal. Language is fluent with no aphasia. Attention and concentration are normal.    Motor  Normal muscle bulk throughout. Normal muscle tone.                                               Right                     Left  Hip flexion                              5                          5  Knee flexion                           5                          5  Knee extension                      5                          5  Plantarflexion                         5                          5  Dorsiflexion                            5                          5    Sensory  Intact to BLE.     Reflexes                                            Right                      Left  Patellar                                2+                         2+    Right pathological reflexes: Ankle clonus absent.  Left pathological reflexes: Ankle clonus absent.    Gait  Casual gait is normal including stance, stride, and arm swing.

## 2025-02-12 NOTE — ASSESSMENT & PLAN NOTE
10 week follow up of T9 and T10 fractures, s/p fall off roof 12/6/24  Compliant with TLSO brace  Pain is 2-3/10 and does not radiate. If he overdoes it then pain is worse, up to 7/10. No BBI  Exam: no midline spinal TTP . BLE 5/5, LT intact    Imaging:  XR thoracic 2/17/25: final report pending. Stable mild fractures, alignment intact   XR thoracic 1/15/25: Stable appearance of the thoracolumbar spine without progressive loss of height or malalignment at T9 or T10   XR thoracic 12/6/24: No radiographic evidence of acute fracture or aggressive osseous lesion. Notably, the previously reported superior endplate cortical step-offs at T9-T10 are better characterized on prior CT exam.   CT a/p 12/6/24: 1. There are slight step-offs involving the anterior cortex of the mild superior endplate compression fractures of T9 and T10. This suggests acute findings.     Plan:  Reviewed imaging with patient. His fractures appear stable, his alignment is intact.  Thankfully his pain is improving, unless he is very active at work, bending/in certain positions for a long period of time. Suspect there may be a musculoskeletal component to his pain as well.   He has no midline TTP and XR is stable, so will wean from brace at this time. Instructions reviewed and provided.   If symptoms change or worsen, advised to let us know and he can be seen back for reevaluation.  Follow-up as needed. Call with any questions or concerns.

## 2025-02-14 ENCOUNTER — TELEPHONE (OUTPATIENT)
Dept: NEUROSURGERY | Facility: CLINIC | Age: 25
End: 2025-02-14

## 2025-02-17 ENCOUNTER — HOSPITAL ENCOUNTER (OUTPATIENT)
Dept: RADIOLOGY | Facility: HOSPITAL | Age: 25
Discharge: HOME | End: 2025-02-17
Payer: COMMERCIAL

## 2025-02-17 ENCOUNTER — OFFICE VISIT (OUTPATIENT)
Dept: NEUROSURGERY | Facility: CLINIC | Age: 25
End: 2025-02-17
Payer: COMMERCIAL

## 2025-02-17 VITALS
SYSTOLIC BLOOD PRESSURE: 142 MMHG | TEMPERATURE: 98.2 F | RESPIRATION RATE: 20 BRPM | DIASTOLIC BLOOD PRESSURE: 80 MMHG | HEART RATE: 99 BPM | HEIGHT: 71 IN | WEIGHT: 204 LBS | BODY MASS INDEX: 28.56 KG/M2 | OXYGEN SATURATION: 96 %

## 2025-02-17 DIAGNOSIS — S22.000A THORACIC COMPRESSION FRACTURE (HCC): ICD-10-CM

## 2025-02-17 DIAGNOSIS — S22.000A THORACIC COMPRESSION FRACTURE (HCC): Primary | ICD-10-CM

## 2025-02-17 PROCEDURE — 99213 OFFICE O/P EST LOW 20 MIN: CPT | Performed by: PHYSICIAN ASSISTANT

## 2025-02-17 PROCEDURE — 72080 X-RAY EXAM THORACOLMB 2/> VW: CPT

## 2025-02-17 NOTE — PATIENT INSTRUCTIONS
You may wean out of back brace by taking the back brace off for short periods of time (ie. 30-45minutes) a few times per day (ie. About three times per day) and each day moving forward gradually increase the amount of time you have the back brace off until fully out of back brace in 2 weeks.      You may complete range of motion and activity as tolerated from neurosurgical standpoint once out of back brace.  Although do suggest you take fall precautions and refrain from activity that increases chance of fall or injury to back.  Further neurosurgical follow up may be on an as needed basis.  Contact our office or present to hospital Emergency Room if experience worsening or new back pain, radiating leg pain, sensory or motor change, bladder or bowel function change, or other neurological change.

## 2025-02-17 NOTE — PROGRESS NOTES
Name: Ken Baker      : 2000      MRN: [id number]  Encounter Provider: Chio Soares PA-C  Encounter Date: 2025   Encounter department: [facility] [place]  :  Assessment & Plan  Thoracic compression fracture (HCC)  10 week follow up of T9 and T10 fractures, s/p fall off roof 24  Compliant with TLSO brace  Pain is 2-3/10 and does not radiate. If he overdoes it then pain is worse, up to 7/10. No BBI  Exam: no midline spinal TTP . BLE 5/5, LT intact    Imaging:  XR thoracic 25: final report pending. Stable mild fractures, alignment intact   XR thoracic 1/15/25: Stable appearance of the thoracolumbar spine without progressive loss of height or malalignment at T9 or T10   XR thoracic 24: No radiographic evidence of acute fracture or aggressive osseous lesion. Notably, the previously reported superior endplate cortical step-offs at T9-T10 are better characterized on prior CT exam.   CT a/p 24: 1. There are slight step-offs involving the anterior cortex of the mild superior endplate compression fractures of T9 and T10. This suggests acute findings.     Plan:  Reviewed imaging with patient. His fractures appear stable, his alignment is intact.  Thankfully his pain is improving, unless he is very active at work, bending/in certain positions for a long period of time. Suspect there may be a musculoskeletal component to his pain as well.   He has no midline TTP and XR is stable, so will wean from brace at this time. Instructions reviewed and provided.   If symptoms change or worsen, advised to let us know and he can be seen back for reevaluation.  Follow-up as needed. Call with any questions or concerns.              History of Present Illness       24 year old gentleman seen for 10-week follow-up of T9 and T10 fracture status post fall off roof 24.  He has been maintained in a TLSO brace.  Reports that his pain is 2-3/10 today and doesn't radiate. He is a   and if he is doing a lot of bending or being in one position for too long his pain will increase to 7/10. Normally he is compliant with his brace, though not wearing today.  No BBI.  Uses tylenol and lidocaine patches as needed for pain.           Review of Systems   Constitutional: Negative.    HENT: Negative.     Eyes: Negative.    Respiratory: Negative.     Cardiovascular: Negative.    Gastrointestinal: Negative.    Endocrine: Negative.    Genitourinary: Negative.    Musculoskeletal:  Positive for back pain (LBP 7/10, (improoving) pain with movement). Negative for gait problem and myalgias.        The longer he stands or longer jhe walks will have more pain in the lower back    Skin: Negative.    Allergic/Immunologic: Negative.    Neurological:  Negative for weakness (intermitten in both legs) and numbness.   Hematological: Negative.    Psychiatric/Behavioral:  Negative for sleep disturbance (pain disrupts sleep at times).      I have personally reviewed the MA's review of systems and made changes as necessary.    Past Medical History   Past Medical History:   Diagnosis Date    Asthma     Thoracic spine fracture (HCC)      Past Surgical History:   Procedure Laterality Date    EAR TUBE REMOVAL      TONSILLECTOMY       History reviewed. No pertinent family history.  he reports that he has never smoked. He has never used smokeless tobacco. He reports that he does not drink alcohol and does not use drugs.  Current Outpatient Medications   Medication Instructions    acetaminophen (TYLENOL) 650 mg, Oral, Every 8 hours PRN    dicyclomine (BENTYL) 20 mg, Oral, 2 times daily    gabapentin (NEURONTIN) 300 mg, Oral, 3 times daily, For post-herpetic neuralgia: Take 1 tablet on day 1,  Then take 2 tablets on day 2, Then take 3 tablets on day 3 and every day after that as instructed by your doctor.    ibuprofen (MOTRIN) 400 mg, Oral, Every 6 hours PRN    lidocaine (Lidoderm) 5 % 1 patch, Topical, Daily, Remove &  "Discard patch within 12 hours or as directed by MD    methocarbamol (ROBAXIN) 500 mg, Oral, 2 times daily    ondansetron (ZOFRAN) 4 mg, Oral, Every 6 hours    ondansetron (ZOFRAN) 4 mg, Oral, Every 6 hours    ondansetron (ZOFRAN-ODT) 4 mg, Oral, Every 6 hours PRN     Allergies   Allergen Reactions    Penicillins Hives      Objective   /80 (BP Location: Left arm, Patient Position: Sitting, Cuff Size: Large)   Pulse 99   Temp 98.2 °F (36.8 °C) (Temporal)   Resp 20   Ht 5' 11\" (1.803 m)   Wt 92.5 kg (204 lb)   SpO2 96%   BMI 28.45 kg/m²     Physical Exam  Vitals reviewed.   Constitutional:       General: He is awake.      Appearance: Normal appearance.   HENT:      Head: Normocephalic and atraumatic.   Eyes:      Conjunctiva/sclera: Conjunctivae normal.   Cardiovascular:      Rate and Rhythm: Normal rate.   Pulmonary:      Effort: Pulmonary effort is normal.   Musculoskeletal:      Comments: No midline spinal TTP  Not wearing brace currently   Skin:     General: Skin is warm and dry.   Neurological:      Mental Status: He is alert.   Psychiatric:         Attention and Perception: Attention and perception normal.         Mood and Affect: Mood and affect normal.         Speech: Speech normal.         Behavior: Behavior normal. Behavior is cooperative.         Thought Content: Thought content normal.         Cognition and Memory: Cognition and memory normal.         Judgment: Judgment normal.       Neurological Exam  Mental Status  Awake and alert. Memory is normal. Speech is normal. Language is fluent with no aphasia. Attention and concentration are normal.    Motor  Normal muscle bulk throughout. Normal muscle tone.                                               Right                     Left  Hip flexion                              5                          5  Knee flexion                           5                          5  Knee extension                      5                          " 5  Plantarflexion                         5                          5  Dorsiflexion                            5                          5    Sensory  Intact to BLE.     Gait  Casual gait is normal including stance, stride, and arm swing.